# Patient Record
Sex: FEMALE | Race: WHITE | NOT HISPANIC OR LATINO | Employment: FULL TIME | URBAN - METROPOLITAN AREA
[De-identification: names, ages, dates, MRNs, and addresses within clinical notes are randomized per-mention and may not be internally consistent; named-entity substitution may affect disease eponyms.]

---

## 2017-04-10 ENCOUNTER — TRANSCRIBE ORDERS (OUTPATIENT)
Dept: CARDIOLOGY | Facility: CLINIC | Age: 27
End: 2017-04-10

## 2017-04-10 DIAGNOSIS — Z02.89 ENCOUNTER FOR OCCUPATIONAL HEALTH EXAMINATION: Primary | ICD-10-CM

## 2017-04-14 ENCOUNTER — HOSPITAL ENCOUNTER (OUTPATIENT)
Dept: CARDIOLOGY | Facility: HOSPITAL | Age: 27
Discharge: HOME | End: 2017-04-14

## 2017-04-14 DIAGNOSIS — Z02.89 ENCOUNTER FOR OCCUPATIONAL HEALTH EXAMINATION: ICD-10-CM

## 2017-04-14 LAB
BH CV STRESS BP STAGE 1: NORMAL
BH CV STRESS BP STAGE 2: NORMAL
BH CV STRESS BP STAGE 3: NORMAL
BH CV STRESS BP STAGE 4: NORMAL
BH CV STRESS DURATION MIN STAGE 1: 3
BH CV STRESS DURATION MIN STAGE 2: 3
BH CV STRESS DURATION MIN STAGE 3: 3
BH CV STRESS DURATION MIN STAGE 4: 1
BH CV STRESS DURATION SEC STAGE 1: 0
BH CV STRESS DURATION SEC STAGE 2: 0
BH CV STRESS DURATION SEC STAGE 3: 0
BH CV STRESS DURATION SEC STAGE 4: 0
BH CV STRESS GRADE STAGE 1: 10
BH CV STRESS GRADE STAGE 2: 12
BH CV STRESS GRADE STAGE 3: 14
BH CV STRESS GRADE STAGE 4: 16
BH CV STRESS HR STAGE 1: 114
BH CV STRESS HR STAGE 2: 141
BH CV STRESS HR STAGE 3: 186
BH CV STRESS HR STAGE 4: 195
BH CV STRESS METS STAGE 1: 5
BH CV STRESS METS STAGE 2: 7.5
BH CV STRESS METS STAGE 3: 10
BH CV STRESS METS STAGE 4: 13.5
BH CV STRESS PROTOCOL 1: NORMAL
BH CV STRESS RECOVERY BP: NORMAL MMHG
BH CV STRESS RECOVERY HR: 97 BPM
BH CV STRESS SPEED STAGE 1: 1.7
BH CV STRESS SPEED STAGE 2: 2.5
BH CV STRESS SPEED STAGE 3: 3.4
BH CV STRESS SPEED STAGE 4: 4.2
BH CV STRESS STAGE 1: 1
BH CV STRESS STAGE 2: 2
BH CV STRESS STAGE 3: 3
BH CV STRESS STAGE 4: 4
MAXIMAL PREDICTED HEART RATE: 192 BPM
PERCENT MAX PREDICTED HR: 101.56 %
STRESS BASELINE BP: NORMAL MMHG
STRESS BASELINE HR: 65 BPM
STRESS PERCENT HR: 119 %
STRESS POST ESTIMATED WORKLOAD: 11 METS
STRESS POST EXERCISE DUR MIN: 10 MIN
STRESS POST EXERCISE DUR SEC: 0 SEC
STRESS POST PEAK BP: NORMAL MMHG
STRESS POST PEAK HR: 195 BPM
STRESS TARGET HR: 163 BPM

## 2017-04-14 PROCEDURE — 93017 CV STRESS TEST TRACING ONLY: CPT

## 2017-04-14 PROCEDURE — 93018 CV STRESS TEST I&R ONLY: CPT | Performed by: INTERNAL MEDICINE

## 2017-04-14 PROCEDURE — 93016 CV STRESS TEST SUPVJ ONLY: CPT | Performed by: INTERNAL MEDICINE

## 2017-05-15 ENCOUNTER — OFFICE VISIT (OUTPATIENT)
Dept: FAMILY MEDICINE CLINIC | Facility: CLINIC | Age: 27
End: 2017-05-15

## 2017-05-15 VITALS
HEIGHT: 60 IN | DIASTOLIC BLOOD PRESSURE: 74 MMHG | SYSTOLIC BLOOD PRESSURE: 118 MMHG | HEART RATE: 69 BPM | WEIGHT: 141 LBS | TEMPERATURE: 97.9 F | OXYGEN SATURATION: 99 % | BODY MASS INDEX: 27.68 KG/M2

## 2017-05-15 DIAGNOSIS — G43.101 MIGRAINE WITH AURA AND WITH STATUS MIGRAINOSUS, NOT INTRACTABLE: ICD-10-CM

## 2017-05-15 DIAGNOSIS — R82.4 KETONURIA: Primary | ICD-10-CM

## 2017-05-15 DIAGNOSIS — R39.15 URGENCY OF MICTURITION: ICD-10-CM

## 2017-05-15 LAB
BILIRUB BLD-MCNC: NEGATIVE MG/DL
CLARITY, POC: CLEAR
COLOR UR: YELLOW
GLUCOSE UR STRIP-MCNC: NEGATIVE MG/DL
KETONES UR QL: NEGATIVE
LEUKOCYTE EST, POC: ABNORMAL
NITRITE UR-MCNC: NEGATIVE MG/ML
PH UR: 5.5 [PH] (ref 5–8)
PROT UR STRIP-MCNC: NEGATIVE MG/DL
RBC # UR STRIP: NEGATIVE /UL
SP GR UR: 1.03 (ref 1–1.03)
UROBILINOGEN UR QL: NORMAL

## 2017-05-15 PROCEDURE — 99214 OFFICE O/P EST MOD 30 MIN: CPT | Performed by: NURSE PRACTITIONER

## 2017-05-15 PROCEDURE — 81003 URINALYSIS AUTO W/O SCOPE: CPT | Performed by: NURSE PRACTITIONER

## 2017-05-15 RX ORDER — PROPRANOLOL HYDROCHLORIDE 20 MG/1
20 TABLET ORAL 2 TIMES DAILY
Qty: 180 TABLET | Refills: 3 | Status: SHIPPED | OUTPATIENT
Start: 2017-05-15 | End: 2018-06-05 | Stop reason: SDUPTHER

## 2017-05-15 RX ORDER — SUMATRIPTAN 50 MG/1
100 TABLET, FILM COATED ORAL ONCE AS NEEDED
Qty: 27 TABLET | Refills: 3 | Status: SHIPPED | OUTPATIENT
Start: 2017-05-15 | End: 2018-06-05 | Stop reason: SDUPTHER

## 2017-05-17 ENCOUNTER — TELEPHONE (OUTPATIENT)
Dept: FAMILY MEDICINE CLINIC | Facility: CLINIC | Age: 27
End: 2017-05-17

## 2017-05-17 LAB
BACTERIA UR CULT: NORMAL
BACTERIA UR CULT: NORMAL

## 2018-04-11 ENCOUNTER — LAB REQUISITION (OUTPATIENT)
Dept: LAB | Facility: OTHER | Age: 28
End: 2018-04-11

## 2018-04-11 DIAGNOSIS — Z00.00 ROUTINE GENERAL MEDICAL EXAMINATION AT A HEALTH CARE FACILITY: ICD-10-CM

## 2018-04-11 LAB
ALBUMIN SERPL-MCNC: 4.5 G/DL (ref 3.5–5.2)
ALP SERPL-CCNC: 63 U/L (ref 39–117)
ALT SERPL W P-5'-P-CCNC: 12 U/L (ref 1–33)
AST SERPL-CCNC: 17 U/L (ref 1–32)
BACTERIA UR QL AUTO: NORMAL /HPF
BASOPHILS # BLD AUTO: 0.02 10*3/MM3 (ref 0–0.2)
BASOPHILS NFR BLD AUTO: 0.2 % (ref 0–1.5)
BILIRUB CONJ SERPL-MCNC: <0.2 MG/DL (ref 0–0.3)
BILIRUB SERPL-MCNC: 0.4 MG/DL (ref 0.1–1.2)
BILIRUB UR QL STRIP: NEGATIVE
BUN BLD-MCNC: 8 MG/DL (ref 6–20)
CALCIUM SPEC-SCNC: 9.6 MG/DL (ref 8.6–10.5)
CHLORIDE SERPL-SCNC: 102 MMOL/L (ref 98–107)
CHOLEST SERPL-MCNC: 150 MG/DL (ref 0–200)
CLARITY UR: CLEAR
CO2 SERPL-SCNC: 26.1 MMOL/L (ref 22–29)
COLOR UR: YELLOW
CREAT BLD-MCNC: 0.76 MG/DL (ref 0.57–1)
DEPRECATED RDW RBC AUTO: 48.6 FL (ref 37–54)
EOSINOPHIL # BLD AUTO: 0.11 10*3/MM3 (ref 0–0.7)
EOSINOPHIL NFR BLD AUTO: 1.3 % (ref 0.3–6.2)
ERYTHROCYTE [DISTWIDTH] IN BLOOD BY AUTOMATED COUNT: 13.8 % (ref 11.7–13)
GFR SERPL CREATININE-BSD FRML MDRD: 90 ML/MIN/1.73
GGT SERPL-CCNC: 12 U/L (ref 5–36)
GLUCOSE BLD-MCNC: 88 MG/DL (ref 65–99)
GLUCOSE UR STRIP-MCNC: NEGATIVE MG/DL
HCT VFR BLD AUTO: 43.6 % (ref 35.6–45.5)
HDLC SERPL-MCNC: 63 MG/DL (ref 40–60)
HGB BLD-MCNC: 14.1 G/DL (ref 11.9–15.5)
HGB UR QL STRIP.AUTO: ABNORMAL
HYALINE CASTS UR QL AUTO: NORMAL /LPF
IMM GRANULOCYTES # BLD: 0 10*3/MM3 (ref 0–0.03)
IMM GRANULOCYTES NFR BLD: 0 % (ref 0–0.5)
IRON 24H UR-MRATE: 68 MCG/DL (ref 37–145)
KETONES UR QL STRIP: NEGATIVE
LDH SERPL-CCNC: 165 U/L (ref 135–214)
LDLC SERPL CALC-MCNC: 75 MG/DL (ref 0–100)
LDLC/HDLC SERPL: 1.19 {RATIO}
LEUKOCYTE ESTERASE UR QL STRIP.AUTO: NEGATIVE
LYMPHOCYTES # BLD AUTO: 2.37 10*3/MM3 (ref 0.9–4.8)
LYMPHOCYTES NFR BLD AUTO: 28.2 % (ref 19.6–45.3)
MCH RBC QN AUTO: 31.4 PG (ref 26.9–32)
MCHC RBC AUTO-ENTMCNC: 32.3 G/DL (ref 32.4–36.3)
MCV RBC AUTO: 97.1 FL (ref 80.5–98.2)
MONOCYTES # BLD AUTO: 0.41 10*3/MM3 (ref 0.2–1.2)
MONOCYTES NFR BLD AUTO: 4.9 % (ref 5–12)
NEUTROPHILS # BLD AUTO: 5.49 10*3/MM3 (ref 1.9–8.1)
NEUTROPHILS NFR BLD AUTO: 65.4 % (ref 42.7–76)
NITRITE UR QL STRIP: NEGATIVE
PH UR STRIP.AUTO: 7.5 [PH] (ref 5–8)
PHOSPHATE SERPL-MCNC: 2.6 MG/DL (ref 2.5–4.5)
PLATELET # BLD AUTO: 174 10*3/MM3 (ref 140–500)
PMV BLD AUTO: 14.3 FL (ref 6–12)
POTASSIUM BLD-SCNC: 4.6 MMOL/L (ref 3.5–5.2)
PROT SERPL-MCNC: 7.3 G/DL (ref 6–8.5)
PROT UR QL STRIP: NEGATIVE
RBC # BLD AUTO: 4.49 10*6/MM3 (ref 3.9–5.2)
RBC # UR: NORMAL /HPF
REF LAB TEST METHOD: NORMAL
SODIUM BLD-SCNC: 141 MMOL/L (ref 136–145)
SP GR UR STRIP: <1.005 (ref 1–1.03)
SQUAMOUS #/AREA URNS HPF: NORMAL /HPF
TRIGL SERPL-MCNC: 60 MG/DL (ref 0–150)
URATE SERPL-MCNC: 3.7 MG/DL (ref 2.4–5.7)
UROBILINOGEN UR QL STRIP: ABNORMAL
VLDLC SERPL-MCNC: 12 MG/DL (ref 5–40)
WBC NRBC COR # BLD: 8.4 10*3/MM3 (ref 4.5–10.7)
WBC UR QL AUTO: NORMAL /HPF

## 2018-04-11 PROCEDURE — 81001 URINALYSIS AUTO W/SCOPE: CPT | Performed by: PHYSICIAN ASSISTANT

## 2018-04-11 PROCEDURE — 86481 TB AG RESPONSE T-CELL SUSP: CPT | Performed by: PHYSICIAN ASSISTANT

## 2018-04-11 PROCEDURE — 80061 LIPID PANEL: CPT | Performed by: PHYSICIAN ASSISTANT

## 2018-04-11 PROCEDURE — 85025 COMPLETE CBC W/AUTO DIFF WBC: CPT | Performed by: PHYSICIAN ASSISTANT

## 2018-04-11 PROCEDURE — 80053 COMPREHEN METABOLIC PANEL: CPT | Performed by: PHYSICIAN ASSISTANT

## 2018-04-13 LAB
TSPOT INTERPRETATION: NEGATIVE
TSPOT NIL CONTROL INTERPRETATION: NORMAL
TSPOT PANEL A: 0
TSPOT PANEL B: 0
TSPOT POS CONTROL INTERPRETATION: NORMAL

## 2018-06-05 ENCOUNTER — OFFICE VISIT (OUTPATIENT)
Dept: FAMILY MEDICINE CLINIC | Facility: CLINIC | Age: 28
End: 2018-06-05

## 2018-06-05 VITALS
OXYGEN SATURATION: 98 % | HEART RATE: 67 BPM | BODY MASS INDEX: 27.09 KG/M2 | DIASTOLIC BLOOD PRESSURE: 64 MMHG | HEIGHT: 60 IN | WEIGHT: 138 LBS | TEMPERATURE: 98.4 F | SYSTOLIC BLOOD PRESSURE: 116 MMHG

## 2018-06-05 DIAGNOSIS — R30.0 DYSURIA: ICD-10-CM

## 2018-06-05 DIAGNOSIS — G43.101 MIGRAINE WITH AURA AND WITH STATUS MIGRAINOSUS, NOT INTRACTABLE: ICD-10-CM

## 2018-06-05 DIAGNOSIS — Z01.419 ENCOUNTER FOR GYNECOLOGICAL EXAMINATION WITH PAPANICOLAOU SMEAR OF CERVIX: Primary | ICD-10-CM

## 2018-06-05 DIAGNOSIS — Z30.019 ENCOUNTER FOR INITIAL PRESCRIPTION OF CONTRACEPTIVES, UNSPECIFIED CONTRACEPTIVE: ICD-10-CM

## 2018-06-05 LAB
B-HCG UR QL: NEGATIVE
BILIRUB BLD-MCNC: NEGATIVE MG/DL
CLARITY, POC: CLEAR
COLOR UR: YELLOW
GLUCOSE UR STRIP-MCNC: NEGATIVE MG/DL
INTERNAL NEGATIVE CONTROL: NEGATIVE
INTERNAL POSITIVE CONTROL: POSITIVE
KETONES UR QL: ABNORMAL
LEUKOCYTE EST, POC: NEGATIVE
Lab: NORMAL
NITRITE UR-MCNC: NEGATIVE MG/ML
PH UR: 8 [PH] (ref 5–8)
PROT UR STRIP-MCNC: NEGATIVE MG/DL
RBC # UR STRIP: NEGATIVE /UL
SP GR UR: 1.01 (ref 1–1.03)
UROBILINOGEN UR QL: NORMAL

## 2018-06-05 PROCEDURE — 81025 URINE PREGNANCY TEST: CPT | Performed by: NURSE PRACTITIONER

## 2018-06-05 PROCEDURE — 99213 OFFICE O/P EST LOW 20 MIN: CPT | Performed by: NURSE PRACTITIONER

## 2018-06-05 PROCEDURE — 81003 URINALYSIS AUTO W/O SCOPE: CPT | Performed by: NURSE PRACTITIONER

## 2018-06-05 PROCEDURE — 99395 PREV VISIT EST AGE 18-39: CPT | Performed by: NURSE PRACTITIONER

## 2018-06-05 RX ORDER — SUMATRIPTAN 50 MG/1
100 TABLET, FILM COATED ORAL ONCE AS NEEDED
Qty: 27 TABLET | Refills: 3 | Status: SHIPPED | OUTPATIENT
Start: 2018-06-05 | End: 2019-03-18 | Stop reason: SDUPTHER

## 2018-06-05 RX ORDER — PROPRANOLOL HYDROCHLORIDE 20 MG/1
TABLET ORAL
Qty: 180 TABLET | Refills: 3 | Status: SHIPPED | OUTPATIENT
Start: 2018-06-05 | End: 2021-09-14

## 2018-06-05 RX ORDER — DROSPIRENONE AND ETHINYL ESTRADIOL 0.03MG-3MG
1 KIT ORAL DAILY
Qty: 28 TABLET | Refills: 12 | Status: SHIPPED | OUTPATIENT
Start: 2018-06-05 | End: 2018-09-25

## 2018-06-05 NOTE — PROGRESS NOTES
Subjective   Betsy Simon is a 28 y.o. female. PAP SMEAR, STD screening.  She also would like to get on birth control.     History of Present Illness   Routine Gyn Exam: Patient here for routine exam.  Current Complaints: annual pap.  Personal Health Questionnaire Reviewed: not asked     Gynecologic History  No LMP recorded.  Contraception: condoms  Last Pap:   Results: normal  Last Mammogram: NA  Results: NA    Obstetric History  : 0  Para: 0  AB: 0    The following portions of the patient's history were reviewed and updated as appropriate: allergies, current medications, past family history, past medical history, past social history, past surgical history and problem list.    Review of Systems   Constitutional: Negative.    Respiratory: Negative.    Cardiovascular: Negative.    Endocrine: Negative for breast discharge.   Genitourinary: Positive for dysuria. Negative for menstrual problem, pelvic pain and breast lump.       Objective   Physical Exam   Constitutional: Vital signs are normal. She appears well-developed and well-nourished. She is cooperative.   Cardiovascular: Normal rate, regular rhythm and normal heart sounds.    Pulmonary/Chest: Effort normal and breath sounds normal. Right breast exhibits no inverted nipple, no mass, no nipple discharge, no skin change and no tenderness. Left breast exhibits no inverted nipple, no mass, no nipple discharge, no skin change and no tenderness. Breasts are symmetrical.   Abdominal: Soft. Normal appearance and bowel sounds are normal.   Genitourinary: Vagina normal, uterus normal and cervix normal. Pelvic exam was performed with patient supine.   Cervix is nulliparous. Right adnexum displays no mass and no tenderness. Left adnexum displays no mass and no tenderness.   Neurological: She is alert.   Nursing note and vitals reviewed.    Vitals:    18 1605   BP: 116/64   Pulse: 67   Temp: 98.4 °F (36.9 °C)   TempSrc: Oral   SpO2: 98%   Weight: 62.6 kg  "(138 lb)   Height: 152.4 cm (60\")     Results for orders placed or performed in visit on 06/05/18   POC Urinalysis Dipstick, Automated   Result Value Ref Range    Color Yellow Yellow, Straw, Dark Yellow, Nay    Clarity, UA Clear Clear    Specific Gravity  1.015 1.005 - 1.030    pH, Urine 8.0 5.0 - 8.0    Leukocytes Negative Negative    Nitrite, UA Negative Negative    Protein, POC Negative Negative mg/dL    Glucose, UA Negative Negative, 1000 mg/dL (3+) mg/dL    Ketones, UA 15 mg/dL (A) Negative    Urobilinogen, UA Normal Normal    Bilirubin Negative Negative    Blood, UA Negative Negative   POC Pregnancy, Urine   Result Value Ref Range    HCG, Urine, QL Negative Negative    Lot Number [id number]     Internal Positive Control Positive     Internal Negative Control Negative        Assessment/Plan   Diagnoses and all orders for this visit:    Encounter for gynecological examination with Papanicolaou smear of cervix  -     Pap IG, Ct-Ng TV Rfx HPV ASCU  -     NuSwab VG+ & HSV    Dysuria  -     NuSwab VG+ & HSV  -     POC Urinalysis Dipstick, Automated    Encounter for initial prescription of contraceptives, unspecified contraceptive  -     POC Pregnancy, Urine  -     drospirenone-ethinyl estradiol (YAMILA 28) 3-0.03 MG per tablet; Take 1 tablet by mouth Daily.    Migraine with aura and with status migrainosus, not intractable  -     propranolol (INDERAL) 20 MG tablet; Take one tablet two times daily  -     SUMAtriptan (IMITREX) 50 MG tablet; Take 2 tablets by mouth 1 (One) Time As Needed for Migraine (may repeat in 2 hrs.) for up to 1 dose.    Pap: Will call results  Pregnancy test negative. Discussed OCP, side effects, cautions.                "

## 2018-06-08 LAB
C TRACH RRNA CVX QL NAA+PROBE: NEGATIVE
CONV .: NORMAL
CYTOLOGIST CVX/VAG CYTO: NORMAL
CYTOLOGY CVX/VAG DOC THIN PREP: NORMAL
DX ICD CODE: NORMAL
HIV 1 & 2 AB SER-IMP: NORMAL
Lab: NORMAL
N GONORRHOEA RRNA CVX QL NAA+PROBE: NEGATIVE
OTHER STN SPEC: NORMAL
PATH REPORT.FINAL DX SPEC: NORMAL
STAT OF ADQ CVX/VAG CYTO-IMP: NORMAL
T VAGINALIS RRNA SPEC QL NAA+PROBE: NEGATIVE

## 2018-06-09 LAB
A VAGINAE DNA VAG QL NAA+PROBE: NORMAL SCORE
BVAB2 DNA VAG QL NAA+PROBE: NORMAL SCORE
C ALBICANS DNA VAG QL NAA+PROBE: NEGATIVE
C GLABRATA DNA VAG QL NAA+PROBE: NEGATIVE
C TRACH RRNA SPEC QL NAA+PROBE: NEGATIVE
HSV1 DNA SPEC QL NAA+PROBE: NEGATIVE
HSV2 DNA SPEC QL NAA+PROBE: NEGATIVE
Lab: NORMAL
MEGA1 DNA VAG QL NAA+PROBE: NORMAL SCORE
N GONORRHOEA RRNA SPEC QL NAA+PROBE: NEGATIVE
T VAGINALIS RRNA SPEC QL NAA+PROBE: NEGATIVE

## 2018-08-10 ENCOUNTER — OFFICE VISIT (OUTPATIENT)
Dept: FAMILY MEDICINE CLINIC | Facility: CLINIC | Age: 28
End: 2018-08-10

## 2018-08-10 VITALS
WEIGHT: 134 LBS | SYSTOLIC BLOOD PRESSURE: 118 MMHG | OXYGEN SATURATION: 98 % | HEIGHT: 60 IN | HEART RATE: 66 BPM | DIASTOLIC BLOOD PRESSURE: 78 MMHG | BODY MASS INDEX: 26.31 KG/M2 | TEMPERATURE: 98.5 F

## 2018-08-10 DIAGNOSIS — R30.0 DYSURIA: Primary | ICD-10-CM

## 2018-08-10 DIAGNOSIS — N30.01 ACUTE CYSTITIS WITH HEMATURIA: ICD-10-CM

## 2018-08-10 LAB
BILIRUB BLD-MCNC: NEGATIVE MG/DL
CLARITY, POC: ABNORMAL
COLOR UR: ABNORMAL
GLUCOSE UR STRIP-MCNC: NEGATIVE MG/DL
KETONES UR QL: ABNORMAL
LEUKOCYTE EST, POC: ABNORMAL
NITRITE UR-MCNC: NEGATIVE MG/ML
PH UR: 7 [PH] (ref 5–8)
PROT UR STRIP-MCNC: NEGATIVE MG/DL
RBC # UR STRIP: ABNORMAL /UL
SP GR UR: 1.02 (ref 1–1.03)
UROBILINOGEN UR QL: NORMAL

## 2018-08-10 PROCEDURE — 99213 OFFICE O/P EST LOW 20 MIN: CPT | Performed by: NURSE PRACTITIONER

## 2018-08-10 PROCEDURE — 81003 URINALYSIS AUTO W/O SCOPE: CPT | Performed by: NURSE PRACTITIONER

## 2018-08-10 RX ORDER — SULFAMETHOXAZOLE AND TRIMETHOPRIM 800; 160 MG/1; MG/1
1 TABLET ORAL 2 TIMES DAILY
Qty: 14 TABLET | Refills: 0 | Status: SHIPPED | OUTPATIENT
Start: 2018-08-10 | End: 2019-03-18

## 2018-08-10 RX ORDER — PHENAZOPYRIDINE HYDROCHLORIDE 100 MG/1
100 TABLET, FILM COATED ORAL 3 TIMES DAILY PRN
Qty: 12 TABLET | Refills: 0 | Status: SHIPPED | OUTPATIENT
Start: 2018-08-10 | End: 2019-03-18

## 2018-08-10 NOTE — PROGRESS NOTES
"Subjective   Betsy Simon is a 28 y.o. female who presents c/o pain with urination x 1 week. Now with low back pain and nausea.     History of Present Illness   Urinary symptoms x 1 week, though was treated by teledoc for vaginitis. Thinks resolved. Vaginal burning has resolved. Last period was 7/23/18, far low back pain and nausea.   The following portions of the patient's history were reviewed and updated as appropriate: allergies, current medications, past family history, past medical history, past social history, past surgical history and problem list.    Review of Systems   Constitutional: Negative for chills and fever.   Genitourinary: Positive for difficulty urinating and dysuria. Negative for urinary incontinence, frequency, urgency and vaginal discharge.   Musculoskeletal: Positive for back pain.   Psychiatric/Behavioral: Negative.      /78   Pulse 66   Temp 98.5 °F (36.9 °C) (Oral)   Ht 152.4 cm (60\")   Wt 60.8 kg (134 lb)   SpO2 98%   BMI 26.17 kg/m²     Objective   Physical Exam   Constitutional: She appears well-developed and well-nourished. No distress.   Abdominal: Soft. There is no tenderness. There is no CVA tenderness.   Psychiatric: She has a normal mood and affect. Her speech is normal and behavior is normal.   Nursing note and vitals reviewed.    Assessment/Plan   Problems Addressed this Visit     None      Visit Diagnoses     Dysuria    -  Primary    Relevant Orders    POCT urinalysis dipstick, automated (Completed)    Urine Culture - Urine, Urine, Clean Catch    Acute cystitis with hematuria        Relevant Medications    phenazopyridine (PYRIDIUM) 100 MG tablet        Results for orders placed or performed in visit on 08/10/18   POCT urinalysis dipstick, automated   Result Value Ref Range    Color Dark Yellow Yellow, Straw, Dark Yellow, Nay    Clarity, UA Cloudy (A) Clear    Specific Gravity  1.020 1.005 - 1.030    pH, Urine 7.0 5.0 - 8.0    Leukocytes Small (1+) (A) " Negative    Nitrite, UA Negative Negative    Protein, POC Negative Negative mg/dL    Glucose, UA Negative Negative, 1000 mg/dL (3+) mg/dL    Ketones, UA Trace (A) Negative    Urobilinogen, UA Normal Normal    Bilirubin Negative Negative    Blood, UA Large (A) Negative     FU 3 days if not settling. Will send for culture. Start bactrim DS for infection

## 2018-08-14 LAB
BACTERIA UR CULT: NORMAL
BACTERIA UR CULT: NORMAL

## 2018-09-25 ENCOUNTER — TELEPHONE (OUTPATIENT)
Dept: FAMILY MEDICINE CLINIC | Facility: CLINIC | Age: 28
End: 2018-09-25

## 2018-09-25 RX ORDER — NORETHINDRONE ACETATE AND ETHINYL ESTRADIOL 1; .02 MG/1; MG/1
1 TABLET ORAL DAILY
Qty: 21 TABLET | Refills: 12 | Status: SHIPPED | OUTPATIENT
Start: 2018-09-25 | End: 2019-03-05 | Stop reason: SDUPTHER

## 2018-09-26 ENCOUNTER — TELEPHONE (OUTPATIENT)
Dept: FAMILY MEDICINE CLINIC | Facility: CLINIC | Age: 28
End: 2018-09-26

## 2019-01-30 ENCOUNTER — LAB REQUISITION (OUTPATIENT)
Dept: LAB | Facility: OTHER | Age: 29
End: 2019-01-30

## 2019-01-30 DIAGNOSIS — Z00.00 ROUTINE GENERAL MEDICAL EXAMINATION AT A HEALTH CARE FACILITY: ICD-10-CM

## 2019-01-30 LAB
ALBUMIN SERPL-MCNC: 4.2 G/DL (ref 3.5–5.2)
ALP SERPL-CCNC: 48 U/L (ref 39–117)
ALT SERPL W P-5'-P-CCNC: 29 U/L (ref 1–33)
AST SERPL-CCNC: 30 U/L (ref 1–32)
BACTERIA UR QL AUTO: ABNORMAL /HPF
BASOPHILS # BLD AUTO: 0.01 10*3/MM3 (ref 0–0.2)
BASOPHILS NFR BLD AUTO: 0.1 % (ref 0–1.5)
BILIRUB CONJ SERPL-MCNC: <0.2 MG/DL (ref 0–0.3)
BILIRUB SERPL-MCNC: 0.3 MG/DL (ref 0.1–1.2)
BILIRUB UR QL STRIP: NEGATIVE
BUN BLD-MCNC: 10 MG/DL (ref 6–20)
CALCIUM SPEC-SCNC: 10 MG/DL (ref 8.6–10.5)
CHLORIDE SERPL-SCNC: 103 MMOL/L (ref 98–107)
CHOLEST SERPL-MCNC: 193 MG/DL (ref 0–200)
CLARITY UR: CLEAR
CO2 SERPL-SCNC: 25.4 MMOL/L (ref 22–29)
COLOR UR: YELLOW
CREAT BLD-MCNC: 0.86 MG/DL (ref 0.57–1)
DEPRECATED RDW RBC AUTO: 48 FL (ref 37–54)
EOSINOPHIL # BLD AUTO: 0.11 10*3/MM3 (ref 0–0.7)
EOSINOPHIL NFR BLD AUTO: 1.1 % (ref 0.3–6.2)
ERYTHROCYTE [DISTWIDTH] IN BLOOD BY AUTOMATED COUNT: 13.5 % (ref 11.7–13)
GFR SERPL CREATININE-BSD FRML MDRD: 77 ML/MIN/1.73
GGT SERPL-CCNC: 11 U/L (ref 5–36)
GLUCOSE BLD-MCNC: 87 MG/DL (ref 65–99)
GLUCOSE UR STRIP-MCNC: NEGATIVE MG/DL
HBV SURFACE AB SER RIA-ACNC: REACTIVE
HCT VFR BLD AUTO: 43.6 % (ref 35.6–45.5)
HDLC SERPL-MCNC: 68 MG/DL (ref 40–60)
HGB BLD-MCNC: 14.1 G/DL (ref 11.9–15.5)
HGB UR QL STRIP.AUTO: ABNORMAL
HYALINE CASTS UR QL AUTO: ABNORMAL /LPF
IMM GRANULOCYTES # BLD AUTO: 0.02 10*3/MM3 (ref 0–0.03)
IMM GRANULOCYTES NFR BLD AUTO: 0.2 % (ref 0–0.5)
IRON 24H UR-MRATE: 144 MCG/DL (ref 37–145)
KETONES UR QL STRIP: NEGATIVE
LDH SERPL-CCNC: 210 U/L (ref 135–214)
LDLC SERPL CALC-MCNC: 104 MG/DL (ref 0–100)
LDLC/HDLC SERPL: 1.54 {RATIO}
LEUKOCYTE ESTERASE UR QL STRIP.AUTO: ABNORMAL
LYMPHOCYTES # BLD AUTO: 2.69 10*3/MM3 (ref 0.9–4.8)
LYMPHOCYTES NFR BLD AUTO: 26.6 % (ref 19.6–45.3)
MCH RBC QN AUTO: 31.6 PG (ref 26.9–32)
MCHC RBC AUTO-ENTMCNC: 32.3 G/DL (ref 32.4–36.3)
MCV RBC AUTO: 97.8 FL (ref 80.5–98.2)
MONOCYTES # BLD AUTO: 0.64 10*3/MM3 (ref 0.2–1.2)
MONOCYTES NFR BLD AUTO: 6.3 % (ref 5–12)
NEUTROPHILS # BLD AUTO: 6.64 10*3/MM3 (ref 1.9–8.1)
NEUTROPHILS NFR BLD AUTO: 65.7 % (ref 42.7–76)
NITRITE UR QL STRIP: NEGATIVE
PH UR STRIP.AUTO: 7 [PH] (ref 5–8)
PHOSPHATE SERPL-MCNC: 3 MG/DL (ref 2.5–4.5)
PLATELET # BLD AUTO: 199 10*3/MM3 (ref 140–500)
PMV BLD AUTO: 13.2 FL (ref 6–12)
POTASSIUM BLD-SCNC: 5.5 MMOL/L (ref 3.5–5.2)
PROT SERPL-MCNC: 7.2 G/DL (ref 6–8.5)
PROT UR QL STRIP: NEGATIVE
RBC # BLD AUTO: 4.46 10*6/MM3 (ref 3.9–5.2)
RBC # UR: ABNORMAL /HPF
REF LAB TEST METHOD: ABNORMAL
SODIUM BLD-SCNC: 142 MMOL/L (ref 136–145)
SP GR UR STRIP: 1.02 (ref 1–1.03)
SQUAMOUS #/AREA URNS HPF: ABNORMAL /HPF
TRIGL SERPL-MCNC: 103 MG/DL (ref 0–150)
URATE SERPL-MCNC: 3.2 MG/DL (ref 2.4–5.7)
UROBILINOGEN UR QL STRIP: ABNORMAL
VLDLC SERPL-MCNC: 20.6 MG/DL (ref 5–40)
WBC NRBC COR # BLD: 10.11 10*3/MM3 (ref 4.5–10.7)
WBC UR QL AUTO: ABNORMAL /HPF

## 2019-01-30 PROCEDURE — 85025 COMPLETE CBC W/AUTO DIFF WBC: CPT | Performed by: PHYSICAL MEDICINE & REHABILITATION

## 2019-01-30 PROCEDURE — 80053 COMPREHEN METABOLIC PANEL: CPT | Performed by: PHYSICAL MEDICINE & REHABILITATION

## 2019-01-30 PROCEDURE — 80061 LIPID PANEL: CPT | Performed by: PHYSICAL MEDICINE & REHABILITATION

## 2019-01-30 PROCEDURE — 86706 HEP B SURFACE ANTIBODY: CPT | Performed by: PHYSICAL MEDICINE & REHABILITATION

## 2019-01-30 PROCEDURE — 81001 URINALYSIS AUTO W/SCOPE: CPT | Performed by: PHYSICAL MEDICINE & REHABILITATION

## 2019-03-06 RX ORDER — NORETHINDRONE ACETATE AND ETHINYL ESTRADIOL 1; .02 MG/1; MG/1
1 TABLET ORAL DAILY
Qty: 21 TABLET | Refills: 4 | Status: SHIPPED | OUTPATIENT
Start: 2019-03-06 | End: 2019-08-13 | Stop reason: ALTCHOICE

## 2019-03-18 ENCOUNTER — OFFICE VISIT (OUTPATIENT)
Dept: FAMILY MEDICINE CLINIC | Facility: CLINIC | Age: 29
End: 2019-03-18

## 2019-03-18 VITALS
OXYGEN SATURATION: 99 % | TEMPERATURE: 97.8 F | HEART RATE: 75 BPM | WEIGHT: 142 LBS | DIASTOLIC BLOOD PRESSURE: 62 MMHG | SYSTOLIC BLOOD PRESSURE: 122 MMHG | BODY MASS INDEX: 27.73 KG/M2

## 2019-03-18 DIAGNOSIS — G43.101 MIGRAINE WITH AURA AND WITH STATUS MIGRAINOSUS, NOT INTRACTABLE: Primary | ICD-10-CM

## 2019-03-18 DIAGNOSIS — R53.83 OTHER FATIGUE: ICD-10-CM

## 2019-03-18 PROCEDURE — 99214 OFFICE O/P EST MOD 30 MIN: CPT | Performed by: FAMILY MEDICINE

## 2019-03-18 RX ORDER — SUMATRIPTAN 100 MG/1
100 TABLET, FILM COATED ORAL ONCE AS NEEDED
Qty: 27 TABLET | Refills: 3 | Status: SHIPPED | OUTPATIENT
Start: 2019-03-18 | End: 2019-09-03 | Stop reason: SDUPTHER

## 2019-03-18 NOTE — PROGRESS NOTES
Subjective   Betsy Simon is a 29 y.o. female. Presents today for   Chief Complaint   Patient presents with   • Headache     follow up on med and ask about b12 injections.         Fatigue   This is a chronic problem. The current episode started more than 1 year ago. The problem occurs constantly. The problem has been unchanged. Associated symptoms include fatigue and nausea. Pertinent negatives include no abdominal pain, chest pain or vomiting. Associated symptoms comments: NO heavy periods;  Feels tired all time.  Feels exhausted when wakes up in am.  [place] snores.  Has not had sleep study.      Denies depression. Nothing aggravates the symptoms. Treatments tried: mvi and b vitamins. The treatment provided no relief.   Migraine    This is a chronic problem. The current episode started more than 1 year ago. The problem occurs intermittently. The problem has been waxing and waning. The pain is located in the temporal and left unilateral region. The quality of the pain is described as throbbing. Associated symptoms include nausea, phonophobia and photophobia. Pertinent negatives include no abdominal pain, blurred vision or vomiting. Associated symptoms comments: Used to have auras, but none for a while.. The symptoms are aggravated by caffeine withdrawal and food (smells, noises can trigger). She has tried triptans (on inderal, helped at 1st though misses doses) for the symptoms. The treatment provided mild (intolerant topamax in past;  1 other forgets name of.) relief. Her past medical history is significant for migraine headaches.     On recent testing for work K+ 5.5;      Review of Systems   Constitutional: Positive for fatigue.   Eyes: Positive for photophobia. Negative for blurred vision.   Respiratory: Negative for shortness of breath.    Cardiovascular: Negative for chest pain, palpitations and leg swelling.   Gastrointestinal: Positive for nausea. Negative for abdominal pain and vomiting.    Psychiatric/Behavioral: Negative for sleep disturbance. The patient is not nervous/anxious.        Patient Active Problem List   Diagnosis   • Headache, migraine   • Allergic rhinitis, seasonal   • Weight gain, abnormal   • Ketonuria   • Orthostasis       Social History     Socioeconomic History   • Marital status: Single     Spouse name: Not on file   • Number of children: Not on file   • Years of education: Not on file   • Highest education level: Not on file   Tobacco Use   • Smoking status: Never Smoker   Substance and Sexual Activity   • Alcohol use: No       No Known Allergies    Current Outpatient Medications on File Prior to Visit   Medication Sig Dispense Refill   • norethindrone-ethinyl estradiol (MICROGESTIN 1/20) 1-20 MG-MCG per tablet TAKE 1 TABLET BY MOUTH DAILY 21 tablet 4   • propranolol (INDERAL) 20 MG tablet Take one tablet two times daily 180 tablet 3   • [DISCONTINUED] SUMAtriptan (IMITREX) 50 MG tablet Take 2 tablets by mouth 1 (One) Time As Needed for Migraine (may repeat in 2 hrs.) for up to 1 dose. 27 tablet 3   • [DISCONTINUED] phenazopyridine (PYRIDIUM) 100 MG tablet Take 1 tablet by mouth 3 (Three) Times a Day As Needed for bladder spasms. 12 tablet 0   • [DISCONTINUED] sulfamethoxazole-trimethoprim (BACTRIM DS,SEPTRA DS) 800-160 MG per tablet Take 1 tablet by mouth 2 (Two) Times a Day. 14 tablet 0     Current Facility-Administered Medications on File Prior to Visit   Medication Dose Route Frequency Provider Last Rate Last Dose   • patient supplied allergy injection  0.15 mL Subcutaneous Once Natalie Rankin APRN       • patient supplied allergy injection  0.15 mL Subcutaneous Weekly Natalie Rankin APRN   0.15 mL at 09/15/16 1538   • patient supplied allergy injection  0.15 mL Subcutaneous Weekly Natalie Rankin APRN   0.4 mL at 09/15/16 1536       Objective   Vitals:    03/18/19 1327   BP: 122/62   Pulse: 75   Temp: 97.8 °F (36.6 °C)   SpO2: 99%   Weight: 64.4 kg (142 lb)        Physical Exam   Constitutional: She appears well-developed and well-nourished.   HENT:   Head: Normocephalic and atraumatic.   Nose: Nose normal.   Mouth/Throat: Uvula is midline, oropharynx is clear and moist and mucous membranes are normal.   Neck: Neck supple. No JVD present. No thyroid mass and no thyromegaly present.   Cardiovascular: Normal rate, regular rhythm and normal heart sounds. Exam reveals no gallop and no friction rub.   No murmur heard.  Pulmonary/Chest: Effort normal and breath sounds normal. No respiratory distress. She has no wheezes. She has no rales.   Abdominal: Soft. Bowel sounds are normal. She exhibits no distension. There is no tenderness. There is no rebound and no guarding.   Musculoskeletal: She exhibits no edema.   Neurological: She is alert.   Skin: Skin is warm and dry.   Psychiatric: She has a normal mood and affect. Her behavior is normal.   Nursing note and vitals reviewed.      Assessment/Plan   Betsy was seen today for headache.    Diagnoses and all orders for this visit:    Migraine with aura and with status migrainosus, not intractable  -     Erenumab-aooe (AIMOVIG) 70 MG/ML prefilled syringe; Inject 1 mL under the skin into the appropriate area as directed Every 30 (Thirty) Days.  -     SUMAtriptan (IMITREX) 100 MG tablet; Take 1 tablet by mouth 1 (One) Time As Needed for Migraine (may repeat in 2 hrs.) for up to 1 dose.    Other fatigue  -     Comprehensive Metabolic Panel  -     Vitamin B12  -     TSH  -     Ambulatory Referral to Sleep Medicine    -d/w migraine options and will try adding aimovig, work on taking inderal scheduled, did warn can cause fatigue  -fatigue - check renal function, b12 and TSH;  Patient had normal CBC recently.  Reports unrested in am, refer for sleep study as well  -K+ was up on recent occupational labs, will recheck as well       -Follow up:  6 weeks and prn

## 2019-03-19 LAB
ALBUMIN SERPL-MCNC: 4.1 G/DL (ref 3.5–5.5)
ALBUMIN/GLOB SERPL: 1.5 {RATIO} (ref 1.2–2.2)
ALP SERPL-CCNC: 60 IU/L (ref 39–117)
ALT SERPL-CCNC: 15 IU/L (ref 0–32)
AST SERPL-CCNC: 14 IU/L (ref 0–40)
BILIRUB SERPL-MCNC: <0.2 MG/DL (ref 0–1.2)
BUN SERPL-MCNC: 12 MG/DL (ref 6–20)
BUN/CREAT SERPL: 14 (ref 9–23)
CALCIUM SERPL-MCNC: 9.3 MG/DL (ref 8.7–10.2)
CHLORIDE SERPL-SCNC: 103 MMOL/L (ref 96–106)
CO2 SERPL-SCNC: 25 MMOL/L (ref 20–29)
CREAT SERPL-MCNC: 0.88 MG/DL (ref 0.57–1)
GLOBULIN SER CALC-MCNC: 2.7 G/DL (ref 1.5–4.5)
GLUCOSE SERPL-MCNC: 111 MG/DL (ref 65–99)
POTASSIUM SERPL-SCNC: 4.3 MMOL/L (ref 3.5–5.2)
PROT SERPL-MCNC: 6.8 G/DL (ref 6–8.5)
SODIUM SERPL-SCNC: 141 MMOL/L (ref 134–144)
TSH SERPL DL<=0.005 MIU/L-ACNC: 0.74 UIU/ML (ref 0.45–4.5)
VIT B12 SERPL-MCNC: 687 PG/ML (ref 232–1245)

## 2019-03-26 ENCOUNTER — TELEPHONE (OUTPATIENT)
Dept: FAMILY MEDICINE CLINIC | Facility: CLINIC | Age: 29
End: 2019-03-26

## 2019-03-27 ENCOUNTER — TELEPHONE (OUTPATIENT)
Dept: FAMILY MEDICINE CLINIC | Facility: CLINIC | Age: 29
End: 2019-03-27

## 2019-03-28 DIAGNOSIS — R53.83 OTHER FATIGUE: Primary | ICD-10-CM

## 2019-04-16 ENCOUNTER — LAB REQUISITION (OUTPATIENT)
Dept: LAB | Facility: OTHER | Age: 29
End: 2019-04-16

## 2019-04-16 DIAGNOSIS — Z00.00 ROUTINE GENERAL MEDICAL EXAMINATION AT A HEALTH CARE FACILITY: ICD-10-CM

## 2019-04-16 PROCEDURE — 86481 TB AG RESPONSE T-CELL SUSP: CPT | Performed by: PHYSICAL MEDICINE & REHABILITATION

## 2019-04-18 LAB
TSPOT INTERPRETATION: NEGATIVE
TSPOT NIL CONTROL INTERPRETATION: NORMAL
TSPOT PANEL A: 1
TSPOT PANEL B: 1
TSPOT POS CONTROL INTERPRETATION: NORMAL

## 2019-05-29 ENCOUNTER — APPOINTMENT (OUTPATIENT)
Dept: SLEEP MEDICINE | Facility: HOSPITAL | Age: 29
End: 2019-05-29

## 2019-08-13 RX ORDER — DROSPIRENONE AND ETHINYL ESTRADIOL 0.03MG-3MG
1 KIT ORAL DAILY
Qty: 21 TABLET | Refills: 11 | Status: SHIPPED | OUTPATIENT
Start: 2019-08-13 | End: 2020-05-11

## 2019-09-03 DIAGNOSIS — G43.101 MIGRAINE WITH AURA AND WITH STATUS MIGRAINOSUS, NOT INTRACTABLE: ICD-10-CM

## 2019-09-03 RX ORDER — SUMATRIPTAN 100 MG/1
100 TABLET, FILM COATED ORAL ONCE AS NEEDED
Qty: 27 TABLET | Refills: 3 | Status: SHIPPED | OUTPATIENT
Start: 2019-09-03 | End: 2023-01-12

## 2020-05-11 RX ORDER — DROSPIRENONE AND ETHINYL ESTRADIOL 0.03MG-3MG
1 KIT ORAL DAILY
Qty: 90 TABLET | Refills: 0 | Status: SHIPPED | OUTPATIENT
Start: 2020-05-11 | End: 2021-09-14

## 2020-08-03 RX ORDER — DROSPIRENONE AND ETHINYL ESTRADIOL 0.03MG-3MG
KIT ORAL
Qty: 84 TABLET | Refills: 3 | OUTPATIENT
Start: 2020-08-03

## 2020-09-28 RX ORDER — DROSPIRENONE AND ETHINYL ESTRADIOL 0.03MG-3MG
KIT ORAL
Qty: 28 TABLET | OUTPATIENT
Start: 2020-09-28

## 2020-10-13 ENCOUNTER — TRANSCRIBE ORDERS (OUTPATIENT)
Dept: ADMINISTRATIVE | Facility: HOSPITAL | Age: 30
End: 2020-10-13

## 2020-10-13 DIAGNOSIS — N93.9 ABNORMAL UTERINE BLEEDING (AUB): Primary | ICD-10-CM

## 2020-10-16 ENCOUNTER — APPOINTMENT (OUTPATIENT)
Dept: GENERAL RADIOLOGY | Facility: HOSPITAL | Age: 30
End: 2020-10-16

## 2021-09-13 PROBLEM — R87.613 HIGH GRADE SQUAMOUS INTRAEPITHELIAL CERVICAL DYSPLASIA: Status: ACTIVE | Noted: 2020-01-01

## 2021-09-13 PROBLEM — C53.9 SQUAMOUS CELL CARCINOMA OF CERVIX (HCC): Status: ACTIVE | Noted: 2021-09-13

## 2021-09-13 PROBLEM — D06.9: Status: ACTIVE | Noted: 2021-09-13

## 2021-09-14 ENCOUNTER — APPOINTMENT (OUTPATIENT)
Dept: LAB | Facility: HOSPITAL | Age: 31
End: 2021-09-14

## 2021-09-14 ENCOUNTER — OFFICE VISIT (OUTPATIENT)
Dept: GYNECOLOGIC ONCOLOGY | Facility: CLINIC | Age: 31
End: 2021-09-14

## 2021-09-14 VITALS
HEIGHT: 62 IN | DIASTOLIC BLOOD PRESSURE: 83 MMHG | BODY MASS INDEX: 32.18 KG/M2 | RESPIRATION RATE: 18 BRPM | OXYGEN SATURATION: 97 % | SYSTOLIC BLOOD PRESSURE: 121 MMHG | HEART RATE: 73 BPM | WEIGHT: 174.9 LBS | TEMPERATURE: 99.2 F

## 2021-09-14 DIAGNOSIS — R87.613 HGSIL (HIGH GRADE SQUAMOUS INTRAEPITHELIAL LESION) ON PAP SMEAR OF CERVIX: Primary | ICD-10-CM

## 2021-09-14 DIAGNOSIS — C53.9 SQUAMOUS CELL CARCINOMA OF CERVIX (HCC): ICD-10-CM

## 2021-09-14 PROCEDURE — 99213 OFFICE O/P EST LOW 20 MIN: CPT | Performed by: OBSTETRICS & GYNECOLOGY

## 2021-09-14 RX ORDER — LANOLIN ALCOHOL/MO/W.PET/CERES
1000 CREAM (GRAM) TOPICAL DAILY
COMMUNITY
Start: 2021-08-20 | End: 2023-01-12

## 2021-09-14 RX ORDER — ALPRAZOLAM 0.5 MG/1
0.5 TABLET ORAL
COMMUNITY
End: 2023-01-12

## 2021-09-14 NOTE — PROGRESS NOTES
Age: 31 y.o.  Sex: female  :  1990  MRN: [id number]       REFERRING PHYSICIAN: Marcelino Villar MD  DATE OF VISIT: 2021        Betsy Puente presents to [facility] [place] [facility] for evaluation and management of history of cervical cancer. She previously had a LEEP at [facility] that showed FIGO 1A1 SCC. There was HGSIL at endocervical margin. She wants to retain fertility and has therefore had subsequent colpos etc. All have returned negative. She denies post coital bleeding or any symptoms related to recurrence.       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    • 20: LEEP   • 20: FIGO IA1 invasive squamous cell carcinoma, tumor size of 1.8cm, DOI 2.5mm, horizonal spread 7mm, All margins negative for malignancy, only HGSIL at endocervical margin. +carcinoma at margins from -3, and from -  • 20: PET W/CT- No local tumor is appreciated, within this patient resolution limitations of the PET scan. No definite metastatic disease. Small subcutaneous lesion in the right hepatic is favored to be inflammatory  • 20: Colposcopy-biopsy performed 12 o'clock  • 20: PATHOLOGY-Strips of benign endocervical glandular epithelium. Endocervical Curettage: No tissue identified   • 3/31/21: Pap smear-negative  • 21: Pap smear-negative    21: 3 month follow up           Past Medical History:  Past Medical History:   Diagnosis Date   • Allergic rhinitis    • GERD (gastroesophageal reflux disease)        Past Surgical History:  Past Surgical History:   Procedure Laterality Date   • LASIK Bilateral    • LEEP  2020        MEDICATIONS:    Current Outpatient Medications:   •  SUMAtriptan (IMITREX) 100 MG tablet, Take 1 tablet by mouth 1 (One) Time As Needed for Migraine (may repeat in 2 hrs.) for up to 1 dose., Disp: 27 tablet, Rfl: 3  •  ALPRAZolam (XANAX) 0.5 MG  "tablet, Take 0.5 mg by mouth., Disp: , Rfl:   •  vitamin B-12 (CYANOCOBALAMIN) 1000 MCG tablet, Take 1,000 mcg by mouth Daily., Disp: , Rfl:     Current Facility-Administered Medications:   •  patient supplied allergy injection, 0.15 mL, Subcutaneous, Once, Cull, Natalie C, APRN  •  patient supplied allergy injection, 0.15 mL, Subcutaneous, Weekly, Cull, Natalie C, APRN, 0.15 mL at 09/15/16 1538  •  patient supplied allergy injection, 0.15 mL, Subcutaneous, Weekly, Cull, Natalie C, APRN, 0.4 mL at 09/15/16 1536    ALLERGIES:  No Known Allergies      ROS:  CONSTITUTIONAL:  Denies fever or chills.   NEUROLOGIC:  Denies headache, focal weakness or sensory changes.   EYES:  Denies change in visual acuity.  HEENT:  Denies nasal congestion or sore throat.   RESPIRATORY:  Denies cough or shortness of breath.   CARDIOVASCULAR:  Denies chest pain or edema.   GI:  Denies abdominal pain, nausea, vomiting, bloody stools or diarrhea.   :  Denies dysuria, leaking or incontinence.  MUSCULOSKELETAL:  Denies back pain or joint pain.   INTEGUMENT:  Denies rash.   ENDOCRINE:  Denies polyuria or polydipsia.   LYMPHATIC:  Denies swollen glands or lymphedema.   PSYCHIATRIC:  Denies depression or anxiety.      PHYSICAL EXAM:  Vitals:    09/14/21 0817   BP: 121/83   Pulse: 73   Resp: 18   Temp: 99.2 °F (37.3 °C)   TempSrc: Temporal   SpO2: 97%   Weight: 79.3 kg (174 lb 14.4 oz)   Height: 157.5 cm (62\")  Comment: new patient height   PainSc: 0-No pain     Body mass index is 31.99 kg/m².  Current Status 9/14/2021   ECOG score 0     PHQ-9 Total Score: 0       GEN: alert and oriented x 3, normal affect, well nourished and hydrated.  CARDIO: regular rate and rhythm.  PULM: Lungs CTA b.l, no RRW   ABD: Soft, nontender, nondistended.   GYN: External genitalia normal, no lesions. Speculum with normal vagina, normal appearingcervix , small discoloration at 12 o'clock pap obtained today   Bimanual exam does not reveal any palpable lesions or " adnexal fullness  EXT: No petechiae, bruising, rash, candida. No CCE.       Result Review :  The pertinent labs, images, and/or pathology as noted in the oncology history were reviewed independently and discussed with the patient.   Original path from [facility] obtained and reviewed along with the addendum from [place].     Penny Smyth DO   09/14/2021    [facility] LABS:   WBC   Date Value Ref Range Status   01/30/2019 10.11 4.50 - 10.70 10*3/mm3 Final     RBC   Date Value Ref Range Status   01/30/2019 4.46 3.90 - 5.20 10*6/mm3 Final     Hemoglobin   Date Value Ref Range Status   01/30/2019 14.1 11.9 - 15.5 g/dL Final     Hematocrit   Date Value Ref Range Status   01/30/2019 43.6 35.6 - 45.5 % Final     Platelets   Date Value Ref Range Status   01/30/2019 199 140 - 500 10*3/mm3 Final     No results found for:     [facility] IMAGING:  No radiology results for the last 90 days.      ASSESSMENT :  • H/o FIGO Ia1 SCC - post LEEP  - Post CKC with HGSIL at endocervical margin only   - Negative PET   - Post colpo with ECC and paps negative x 2   • Desire to retain fertility           PLAN :  • Pt counseled regarding reportable symptoms   • Pap sent today   • Will call if abnormal and warrants intervention   • Otherwise return for surveillance in 3 months.             Penny Smyth D.O  9/14/2021    Gynecologic Oncology   [address] [place] [address]  [phone] office

## 2021-09-17 LAB
CYTOLOGIST CVX/VAG CYTO: NORMAL
CYTOLOGY CVX/VAG DOC CYTO: NORMAL
DX ICD CODE: NORMAL
HIV 1 & 2 AB SER-IMP: NORMAL
OTHER STN SPEC: NORMAL
STAT OF ADQ CVX/VAG CYTO-IMP: NORMAL

## 2021-12-14 ENCOUNTER — OFFICE VISIT (OUTPATIENT)
Dept: GYNECOLOGIC ONCOLOGY | Facility: CLINIC | Age: 31
End: 2021-12-14

## 2021-12-14 VITALS
BODY MASS INDEX: 32.39 KG/M2 | RESPIRATION RATE: 18 BRPM | OXYGEN SATURATION: 99 % | SYSTOLIC BLOOD PRESSURE: 135 MMHG | WEIGHT: 176 LBS | HEIGHT: 62 IN | DIASTOLIC BLOOD PRESSURE: 87 MMHG | HEART RATE: 71 BPM

## 2021-12-14 DIAGNOSIS — C53.9 SQUAMOUS CELL CARCINOMA OF CERVIX (HCC): Primary | ICD-10-CM

## 2021-12-14 PROCEDURE — 99213 OFFICE O/P EST LOW 20 MIN: CPT | Performed by: OBSTETRICS & GYNECOLOGY

## 2021-12-14 RX ORDER — INDOLE-3-CARBINOL
POWDER (GRAM) MISCELLANEOUS DAILY
COMMUNITY
End: 2023-01-12

## 2021-12-14 NOTE — PROGRESS NOTES
Age: 31 y.o.  Sex: female  :  1990  MRN: [id number]       REFERRING PHYSICIAN: No ref. provider found  DATE OF VISIT: 2021     Betsy Puente presents to [facility] for evaluation and management of history of cervical cancer. She previously had a LEEP at [facility] that showed FIGO 1A1 SCC. There was HGSIL at endocervical margin. She wants to retain fertility and has therefore had subsequent colpos etc. All have returned negative. She denies post coital bleeding or any symptoms related to recurrence. Today she reports increased cramping but no other symptoms. She does not currently have definitive reproductive plans.       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    • 20: LEEP   • 20: FIGO IA1 invasive squamous cell carcinoma, tumor size of 1.8cm, DOI 2.5mm, horizonal spread 7mm, All margins negative for malignancy, only HGSIL at endocervical margin. +carcinoma at margins from -3, and from -  • 20: PET W/CT- No local tumor is appreciated, within this patient resolution limitations of the PET scan. No definite metastatic disease. Small subcutaneous lesion in the right hepatic is favored to be inflammatory  • 20: Colposcopy-biopsy performed 12 o'clock  • 20: PATHOLOGY-Strips of benign endocervical glandular epithelium. Endocervical Curettage: No tissue identified   • 3/31/21: Pap smear-negative  • 21: Pap smear-negative  • 21: Pap smear-negative    21: 3 month follow up           Past Medical History:  Past Medical History:   Diagnosis Date   • Allergic rhinitis    • GERD (gastroesophageal reflux disease)        Past Surgical History:  Past Surgical History:   Procedure Laterality Date   • LASIK Bilateral    • LEEP  2020        MEDICATIONS:    Current Outpatient Medications:   •  ALPRAZolam (XANAX) 0.5 MG tablet, Take 0.5 mg by  "mouth., Disp: , Rfl:   •  SUMAtriptan (IMITREX) 100 MG tablet, Take 1 tablet by mouth 1 (One) Time As Needed for Migraine (may repeat in 2 hrs.) for up to 1 dose., Disp: 27 tablet, Rfl: 3  •  vitamin B-12 (CYANOCOBALAMIN) 1000 MCG tablet, Take 1,000 mcg by mouth Daily., Disp: , Rfl:   •  Indole-3-Carbinol powder, Daily., Disp: , Rfl:   •  MULTIPLE MINERALS-VITAMINS PO, Take  by mouth., Disp: , Rfl:   •  PROBIOTIC PRODUCT PO, Take 1 tablet by mouth Daily., Disp: , Rfl:     Current Facility-Administered Medications:   •  patient supplied allergy injection, 0.15 mL, Subcutaneous, Once, Ioana Rankinbeth C, APRN  •  patient supplied allergy injection, 0.15 mL, Subcutaneous, Weekly, Cull, Natalie C, APRN, 0.15 mL at 09/15/16 1538  •  patient supplied allergy injection, 0.15 mL, Subcutaneous, Weekly, Cull, Natalie C, APRN, 0.4 mL at 09/15/16 1536    ALLERGIES:  No Known Allergies      ROS:  CONSTITUTIONAL:  Denies fever or chills.   NEUROLOGIC:  Denies headache, focal weakness or sensory changes.   EYES:  Denies change in visual acuity.  HEENT:  Denies nasal congestion or sore throat.   RESPIRATORY:  Denies cough or shortness of breath.   CARDIOVASCULAR:  Denies chest pain or edema.   GI:  Denies abdominal pain, nausea, vomiting, bloody stools or diarrhea.   :  Denies dysuria, leaking or incontinence.  MUSCULOSKELETAL:  Denies back pain or joint pain.   INTEGUMENT:  Denies rash.   ENDOCRINE:  Denies polyuria or polydipsia.   LYMPHATIC:  Denies swollen glands or lymphedema.   PSYCHIATRIC:  Denies depression or anxiety.      PHYSICAL EXAM:  Vitals:    12/14/21 0808   BP: 135/87   Pulse: 71   Resp: 18   SpO2: 99%   Weight: 79.8 kg (176 lb)   Height: 157.5 cm (62.01\")   PainSc: 0-No pain     Body mass index is 32.18 kg/m².  Current Status 12/14/2021   ECOG score 0     PHQ-9 Total Score:         GEN: alert and oriented x 3, normal affect, well nourished and hydrated.  CARDIO: regular rate and rhythm.  PULM: Lungs CTA b.l, " no RRW   ABD: Soft, nontender, nondistended.   GYN: External genitalia normal, no lesions. Speculum with normal vagina, normal appearing cervix, pap obtained today.   Bimanual exam does not reveal any palpable lesions or adnexal fullness  EXT: No petechiae, bruising, rash, candida. No CCE.       Result Review :  The pertinent labs, images, and/or pathology as noted in the oncology history were reviewed independently and discussed with the patient.   Original path from [facility] obtained and reviewed along with the addendum from [place].     Penny Smyth,    09/14/2021    [facility]:   WBC   Date Value Ref Range Status   01/30/2019 10.11 4.50 - 10.70 10*3/mm3 Final     RBC   Date Value Ref Range Status   01/30/2019 4.46 3.90 - 5.20 10*6/mm3 Final     Hemoglobin   Date Value Ref Range Status   01/30/2019 14.1 11.9 - 15.5 g/dL Final     Hematocrit   Date Value Ref Range Status   01/30/2019 43.6 35.6 - 45.5 % Final     Platelets   Date Value Ref Range Status   01/30/2019 199 140 - 500 10*3/mm3 Final     No results found for:     [facility]:  No radiology results for the last 90 days.      ASSESSMENT :  • H/o FIGO Ia1 SCC - post LEEP  - Post CKC with HGSIL at endocervical margin only   - Negative PET   - Post colpo with ECC and paps negative x 2   • New pelvic pain/cramping   • Desire to retain fertility           PLAN :  • Pap sent today   • Obtain CT scan pelvis to eval new cause of pain and cramping.  • Will call if abnormal and warrants intervention , possible EUA with D and C.   • Otherwise return for surveillance in 3 months.             Penny Smyth D.O  12/14/2021    Gynecologic Oncology   [address] [place] [address] [address] [place] [address]  [phone] office

## 2021-12-28 ENCOUNTER — APPOINTMENT (OUTPATIENT)
Dept: CT IMAGING | Facility: HOSPITAL | Age: 31
End: 2021-12-28

## 2022-01-04 PROCEDURE — U0004 COV-19 TEST NON-CDC HGH THRU: HCPCS | Performed by: FAMILY MEDICINE

## 2022-01-10 ENCOUNTER — APPOINTMENT (OUTPATIENT)
Dept: ULTRASOUND IMAGING | Facility: HOSPITAL | Age: 32
End: 2022-01-10

## 2022-03-07 NOTE — PROGRESS NOTES
Age: 32 y.o.  Sex: female  :  1990  MRN: [id number]       REFERRING PHYSICIAN: No ref. provider found  DATE OF VISIT: 3/7/2022     Betsy Puente presents to [facility] for evaluation and management of history of cervical cancer. She previously had a LEEP at [facility] that showed FIGO 1A1 SCC. There was HGSIL at endocervical margin. She wants to retain fertility and has therefore had subsequent colpos, etc. All have returned negative.   At last visit on 21, pt was complaining of some cramping pain, however she states this has since resolved.  CT was ordered at that time, secondary to cramping, however pt did not complete this secondary to financial cost.  She has no complaints today.  She denies post coital bleeding or any symptoms related to recurrence.      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    • 20: LEEP   • 20: FIGO IA1 invasive squamous cell carcinoma, tumor size of 1.8cm, DOI 2.5mm, horizonal spread 7mm, All margins negative for malignancy, only HGSIL at endocervical margin. +carcinoma at margins from -3, and from -  • 20: PET W/CT- No local tumor is appreciated, within this patient resolution limitations of the PET scan. No definite metastatic disease. Small subcutaneous lesion in the right hepatic is favored to be inflammatory  • 20: Colposcopy-biopsy performed 12 o'clock  • 20: PATHOLOGY-Strips of benign endocervical glandular epithelium. Endocervical Curettage: No tissue identified   • 3/31/21: Pap smear-negative  • 21: Pap smear-negative  • 21: Pap smear-negative    21: 3 month follow up           Past Medical History:  Past Medical History:   Diagnosis Date   • Allergic rhinitis    • GERD (gastroesophageal reflux disease)        Past Surgical History:  Past Surgical History:   Procedure Laterality Date   • LASIK  Bilateral    • LEEP  11/2020        MEDICATIONS:    Current Outpatient Medications:   •  ALPRAZolam (XANAX) 0.5 MG tablet, Take 0.5 mg by mouth., Disp: , Rfl:   •  Ascorbic Acid (VITAMIN C PO), Take  by mouth., Disp: , Rfl:   •  Indole-3-Carbinol powder, Daily., Disp: , Rfl:   •  MULTIPLE MINERALS-VITAMINS PO, Take  by mouth., Disp: , Rfl:   •  PROBIOTIC PRODUCT PO, Take 1 tablet by mouth Daily., Disp: , Rfl:   •  SUMAtriptan (IMITREX) 100 MG tablet, Take 1 tablet by mouth 1 (One) Time As Needed for Migraine (may repeat in 2 hrs.) for up to 1 dose., Disp: 27 tablet, Rfl: 3  •  vitamin B-12 (CYANOCOBALAMIN) 1000 MCG tablet, Take 1,000 mcg by mouth Daily., Disp: , Rfl:     Current Facility-Administered Medications:   •  patient supplied allergy injection, 0.15 mL, Subcutaneous, Once, Natalie Rankin, APRN  •  patient supplied allergy injection, 0.15 mL, Subcutaneous, Weekly, Natalie Rankin, APRN, 0.15 mL at 09/15/16 1538  •  patient supplied allergy injection, 0.15 mL, Subcutaneous, Weekly, Natalie Rankin, APRN, 0.4 mL at 09/15/16 1536    ALLERGIES:  No Known Allergies      ROS:  CONSTITUTIONAL:  Denies fever or chills.   NEUROLOGIC:  Denies headache, focal weakness or sensory changes.   EYES:  Denies change in visual acuity.  HEENT:  Denies nasal congestion or sore throat.   RESPIRATORY:  Denies cough or shortness of breath.   CARDIOVASCULAR:  Denies chest pain or edema.   GI:  Denies abdominal pain, nausea, vomiting, bloody stools or diarrhea.   :  Denies dysuria, leaking or incontinence.  MUSCULOSKELETAL:  Denies back pain or joint pain.   INTEGUMENT:  Denies rash.   ENDOCRINE:  Denies polyuria or polydipsia.   LYMPHATIC:  Denies swollen glands or lymphedema.   PSYCHIATRIC:  Denies depression or anxiety.      PHYSICAL EXAM:  There were no vitals filed for this visit.  There is no height or weight on file to calculate BMI.  Current Status 12/14/2021   ECOG score 0     PHQ-9 Total Score:         GEN: alert  and oriented x 3, normal affect, well nourished and hydrated.  CARDIO: regular rate and rhythm.  PULM: Lungs CTA b.l, no RRW   ABD: Soft, nontender, nondistended.   GYN: External genitalia normal, no lesions. Speculum with normal vagina, normal appearing cervix, Pap obtained   Bimanual exam does not reveal any palpable lesions or adnexal fullness  EXT: No petechiae, bruising, rash, candida. No CCE.       Result Review :  The pertinent labs, images, and/or pathology as noted in the oncology history were reviewed independently and discussed with the patient.   Original path from [facility] obtained and reviewed along with the addendum from [facility].     Penny YUAN DO Haja   09/14/2021    [facility] LABS:   WBC   Date Value Ref Range Status   01/30/2019 10.11 4.50 - 10.70 10*3/mm3 Final     RBC   Date Value Ref Range Status   01/30/2019 4.46 3.90 - 5.20 10*6/mm3 Final     Hemoglobin   Date Value Ref Range Status   01/30/2019 14.1 11.9 - 15.5 g/dL Final     Hematocrit   Date Value Ref Range Status   01/30/2019 43.6 35.6 - 45.5 % Final     Platelets   Date Value Ref Range Status   01/30/2019 199 140 - 500 10*3/mm3 Final     No results found for:     [facility] IMAGING:  No radiology results for the last 90 days.      ASSESSMENT :  • H/o FIGO Ia1 SCC - post LEEP  - Post CKC with HGSIL at endocervical margin only   - Negative PET   - Post colpo with ECC and paps negative x 2   • pelvic pain/cramping- resolved  • Desire to retain fertility       PLAN :  • PAP smear today- will call with result  • Repeat CT A/P- pt concerned about financial cost of CT, will have billing reach out about financial aid/scholarship  • Surveillance visit in three months  • Call sooner with questions, concerns, new symptoms            Melody Mary PA-C  3/7/2022    Gynecologic Oncology   [address] [place] [id number] [phone] office

## 2022-03-09 ENCOUNTER — OFFICE VISIT (OUTPATIENT)
Dept: GYNECOLOGIC ONCOLOGY | Facility: CLINIC | Age: 32
End: 2022-03-09

## 2022-03-09 VITALS
SYSTOLIC BLOOD PRESSURE: 131 MMHG | HEIGHT: 60 IN | OXYGEN SATURATION: 95 % | BODY MASS INDEX: 36.06 KG/M2 | HEART RATE: 77 BPM | TEMPERATURE: 97.1 F | DIASTOLIC BLOOD PRESSURE: 90 MMHG | WEIGHT: 183.7 LBS

## 2022-03-09 DIAGNOSIS — C53.9 SQUAMOUS CELL CARCINOMA OF CERVIX: Primary | ICD-10-CM

## 2022-03-09 PROCEDURE — 99213 OFFICE O/P EST LOW 20 MIN: CPT | Performed by: PHYSICIAN ASSISTANT

## 2022-03-15 ENCOUNTER — APPOINTMENT (OUTPATIENT)
Dept: CT IMAGING | Facility: HOSPITAL | Age: 32
End: 2022-03-15

## 2022-03-16 ENCOUNTER — TELEPHONE (OUTPATIENT)
Dept: GYNECOLOGIC ONCOLOGY | Facility: CLINIC | Age: 32
End: 2022-03-16

## 2022-03-16 DIAGNOSIS — C53.9 SQUAMOUS CELL CARCINOMA OF CERVIX: Primary | ICD-10-CM

## 2022-06-04 NOTE — PROGRESS NOTES
Subjective   Betsy Simon is a 28 y.o. female. Would like to get refills on her migraine medications. She is also having some burning and wants to make sure she does not have UTI.      Migraine    This is a chronic problem. The current episode started more than 1 year ago. The problem has been unchanged. The pain is located in the frontal region. Radiates to: back of the head. The pain quality is similar to prior headaches. The quality of the pain is described as aching, throbbing, sharp and stabbing. The pain is moderate. Associated symptoms include phonophobia, photophobia and a visual change (stars). Associated symptoms comments: Auras  . Exacerbated by: smells. She has tried triptans and beta blockers for the symptoms. The treatment provided significant relief. Her past medical history is significant for migraine headaches.   Difficulty Urinating   This is a new problem. The current episode started yesterday. The problem occurs intermittently. The problem has been unchanged. Associated symptoms include headaches, urinary symptoms and a visual change (stars). Nothing aggravates the symptoms. She has tried nothing for the symptoms. The treatment provided no relief.        The following portions of the patient's history were reviewed and updated as appropriate: allergies, current medications, past family history, past medical history, past social history, past surgical history and problem list.    Review of Systems   Constitutional: Negative.    Eyes: Positive for photophobia.   Respiratory: Negative.    Cardiovascular: Negative.    Genitourinary: Positive for difficulty urinating.   Neurological: Positive for headache.       Objective   Physical Exam   Constitutional: She is oriented to person, place, and time. Vital signs are normal. She appears well-developed and well-nourished. She is cooperative.   Eyes: Conjunctivae and EOM are normal. Pupils are equal, round, and reactive to light.   Cardiovascular:  "Normal rate, regular rhythm and normal heart sounds.    Pulmonary/Chest: Effort normal and breath sounds normal.   Neurological: She is alert and oriented to person, place, and time.   Psychiatric: She has a normal mood and affect. Her speech is normal and behavior is normal. Judgment and thought content normal. Cognition and memory are normal.   Nursing note and vitals reviewed.    Vitals:    06/05/18 1605   BP: 116/64   Pulse: 67   Temp: 98.4 °F (36.9 °C)   TempSrc: Oral   SpO2: 98%   Weight: 62.6 kg (138 lb)   Height: 152.4 cm (60\")     Results for orders placed or performed in visit on 06/05/18   POC Urinalysis Dipstick, Automated   Result Value Ref Range    Color Yellow Yellow, Straw, Dark Yellow, Nay    Clarity, UA Clear Clear    Specific Gravity  1.015 1.005 - 1.030    pH, Urine 8.0 5.0 - 8.0    Leukocytes Negative Negative    Nitrite, UA Negative Negative    Protein, POC Negative Negative mg/dL    Glucose, UA Negative Negative, 1000 mg/dL (3+) mg/dL    Ketones, UA 15 mg/dL (A) Negative    Urobilinogen, UA Normal Normal    Bilirubin Negative Negative    Blood, UA Negative Negative   POC Pregnancy, Urine   Result Value Ref Range    HCG, Urine, QL Negative Negative    Lot Number [id number]     Internal Positive Control Positive     Internal Negative Control Negative        Assessment/Plan   Diagnoses and all orders for this visit:    Encounter for gynecological examination with Papanicolaou smear of cervix  -     Pap IG, Ct-Ng TV Rfx HPV ASCU  -     NuSwab VG+ & HSV    Dysuria  -     NuSwab VG+ & HSV  -     POC Urinalysis Dipstick, Automated    Encounter for initial prescription of contraceptives, unspecified contraceptive  -     POC Pregnancy, Urine  -     drospirenone-ethinyl estradiol (YAMILA 28) 3-0.03 MG per tablet; Take 1 tablet by mouth Daily.    Migraine with aura and with status migrainosus, not intractable  -     propranolol (INDERAL) 20 MG tablet; Take one tablet two times daily  -     SUMAtriptan " (IMITREX) 50 MG tablet; Take 2 tablets by mouth 1 (One) Time As Needed for Migraine (may repeat in 2 hrs.) for up to 1 dose.    Refill Inderal and Sumatriptan as seems to control headaches.  UA negative, may be some vaginitis. Will await pap results.               For information on Fall & Injury Prevention, visit: [email or web address] OR  [email or web address] OR  https://www.cdc.gov/steadi/patient.html

## 2022-06-06 ENCOUNTER — TELEPHONE (OUTPATIENT)
Dept: GYNECOLOGIC ONCOLOGY | Age: 32
End: 2022-06-06

## 2022-06-06 NOTE — TELEPHONE ENCOUNTER
Caller: Betsy Puente    Relationship to patient: Self    Best call back number: [phone]    Chief complaint: PATIENT STATES SHE WAS SCHEDULED FOR 6.8.22 AND RESCHED TO 6/13/22 BUT WAS NOT NOTIFIED OF APPT CHANGE. REQUESTING CALL BACK  \

## 2022-06-10 NOTE — PROGRESS NOTES
Age: 32 y.o.  Sex: female  :  1990  MRN: [id number]       REFERRING PHYSICIAN: No ref. provider found  DATE OF VISIT: 2022     Betsy Puente presents to [facility] for evaluation and management of history of cervical cancer. She previously had a LEEP at [facility] that showed FIGO 1A1 SCC. There was HGSIL at endocervical margin. She wants to retain fertility and has therefore had subsequent colpos- all have returned negative.   She was last seen on 3/9/22 and doing well at that time. CT ordered in 2021 and again in March however pt did not complete this secondary to financial cost.  She has no complaints today.  She denies post coital bleeding or any symptoms related to recurrence.       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    • 20: LEEP   • 20: FIGO IA1 invasive squamous cell carcinoma, tumor size of 1.8cm, DOI 2.5mm, horizonal spread 7mm, All margins negative for malignancy, only HGSIL at endocervical margin. +carcinoma at margins from -3, and from -  • 20: PET W/CT- No local tumor is appreciated, within this patient resolution limitations of the PET scan. No definite metastatic disease. Small subcutaneous lesion in the right hepatic is favored to be inflammatory  • 20: Colposcopy-biopsy performed 12 o'clock  • 20: PATHOLOGY-Strips of benign endocervical glandular epithelium. Endocervical Curettage: No tissue identified   • 3/31/21: Pap smear-negative  • 21: Pap smear-negative  • 21: Pap smear-negative  • 21: Pap smear-negative  • 22: Pap smear-negative    22: 3 month follow up         Past Medical History:  Past Medical History:   Diagnosis Date   • Allergic rhinitis    • GERD (gastroesophageal reflux disease)        Past Surgical History:  Past Surgical History:   Procedure Laterality Date   • LASIK  "Bilateral    • LEEP  11/2020        MEDICATIONS:    Current Outpatient Medications:   •  ALPRAZolam (XANAX) 0.5 MG tablet, Take 0.5 mg by mouth., Disp: , Rfl:   •  Ascorbic Acid (VITAMIN C PO), Take  by mouth., Disp: , Rfl:   •  Indole-3-Carbinol powder, Daily., Disp: , Rfl:   •  MULTIPLE MINERALS-VITAMINS PO, Take  by mouth., Disp: , Rfl:   •  PROBIOTIC PRODUCT PO, Take 1 tablet by mouth Daily., Disp: , Rfl:   •  SUMAtriptan (IMITREX) 100 MG tablet, Take 1 tablet by mouth 1 (One) Time As Needed for Migraine (may repeat in 2 hrs.) for up to 1 dose., Disp: 27 tablet, Rfl: 3  •  vitamin B-12 (CYANOCOBALAMIN) 1000 MCG tablet, Take 1,000 mcg by mouth Daily., Disp: , Rfl:   No current facility-administered medications for this visit.    ALLERGIES:  No Known Allergies      ROS:  CONSTITUTIONAL:  Denies fever or chills.   NEUROLOGIC:  Denies headache, focal weakness or sensory changes.   EYES:  Denies change in visual acuity.  HEENT:  Denies nasal congestion or sore throat.   RESPIRATORY:  Denies cough or shortness of breath.   CARDIOVASCULAR:  Denies chest pain or edema.   GI:  Denies abdominal pain, nausea, vomiting, bloody stools or diarrhea.   :  Denies dysuria, leaking or incontinence.  MUSCULOSKELETAL:  Denies back pain or joint pain.   INTEGUMENT:  Denies rash.   ENDOCRINE:  Denies polyuria or polydipsia.   LYMPHATIC:  Denies swollen glands or lymphedema.   PSYCHIATRIC:  Denies depression or anxiety.      PHYSICAL EXAM:  Vitals:    06/13/22 0838   BP: 132/85   Pulse: 70   Resp: 18   Temp: 97.9 °F (36.6 °C)   TempSrc: Oral   SpO2: 98%   Weight: 83.2 kg (183 lb 8 oz)   Height: 152.4 cm (60\")   PainSc: 0-No pain     Body mass index is 35.84 kg/m².  Current Status 6/13/2022   ECOG score 0     PHQ-9 Total Score:         GEN: alert and oriented x 3, normal affect, well nourished and hydrated.  CARDIO: regular rate and rhythm.  PULM: Lungs CTA b.l, no RRW   ABD: Soft, nontender, nondistended.   GYN: External genitalia " normal, no lesions. Speculum with normal vagina, normal appearing cervix, Pap obtained   Bimanual exam does not reveal any palpable lesions or adnexal fullness  EXT: No petechiae, bruising, rash, candida. No CCE.       Result Review :  The pertinent labs, images, and/or pathology as noted in the oncology history were reviewed independently and discussed with the patient.   Original path from [facility] obtained and reviewed along with the addendum from [facility].     Melody Mary PA-C   09/14/2021    [facility]:   WBC   Date Value Ref Range Status   01/30/2019 10.11 4.50 - 10.70 10*3/mm3 Final     RBC   Date Value Ref Range Status   01/30/2019 4.46 3.90 - 5.20 10*6/mm3 Final     Hemoglobin   Date Value Ref Range Status   01/30/2019 14.1 11.9 - 15.5 g/dL Final     Hematocrit   Date Value Ref Range Status   01/30/2019 43.6 35.6 - 45.5 % Final     Platelets   Date Value Ref Range Status   01/30/2019 199 140 - 500 10*3/mm3 Final     No results found for:     [facility]:  No radiology results for the last 90 days.      ASSESSMENT :  • H/o FIGO Ia1 SCC - post LEEP  - Post CKC with HGSIL at endocervical margin only   - Negative PET   - Post colpo with ECC and paps negative x 2   • Desire to retain fertility       PLAN :  • PAP smear today- will call with result  • Pt will call if insurance changes/able to have CT  • Surveillance visit in three months  • Call sooner with questions, concerns, new symptoms            Melody Mary PA-C  6/13/2022    Gynecologic Oncology   [address] [place] [address]  [phone] office

## 2022-06-13 ENCOUNTER — OFFICE VISIT (OUTPATIENT)
Dept: GYNECOLOGIC ONCOLOGY | Facility: CLINIC | Age: 32
End: 2022-06-13

## 2022-06-13 VITALS
HEIGHT: 60 IN | OXYGEN SATURATION: 98 % | WEIGHT: 183.5 LBS | TEMPERATURE: 97.9 F | BODY MASS INDEX: 36.02 KG/M2 | DIASTOLIC BLOOD PRESSURE: 85 MMHG | RESPIRATION RATE: 18 BRPM | HEART RATE: 70 BPM | SYSTOLIC BLOOD PRESSURE: 132 MMHG

## 2022-06-13 DIAGNOSIS — C53.9 SQUAMOUS CELL CARCINOMA OF CERVIX: Primary | ICD-10-CM

## 2022-06-13 PROCEDURE — 99213 OFFICE O/P EST LOW 20 MIN: CPT | Performed by: PHYSICIAN ASSISTANT

## 2022-06-16 LAB
CYTOLOGIST CVX/VAG CYTO: NORMAL
CYTOLOGY CVX/VAG DOC CYTO: NORMAL
DX ICD CODE: NORMAL
HIV 1 & 2 AB SER-IMP: NORMAL
Lab: NORMAL
OTHER STN SPEC: NORMAL
STAT OF ADQ CVX/VAG CYTO-IMP: NORMAL

## 2022-06-17 ENCOUNTER — TELEPHONE (OUTPATIENT)
Dept: GYNECOLOGIC ONCOLOGY | Facility: CLINIC | Age: 32
End: 2022-06-17

## 2022-06-17 NOTE — TELEPHONE ENCOUNTER
----- Message from Melody Mary PA-C sent at 6/16/2022  4:39 PM EDT -----  Regarding: FW:  Please let pt know PAP Smear was normal  ----- Message -----  From: Mary Kate Lang Results In  Sent: 6/16/2022   3:28 PM EDT  To: Melody Mary PA-C

## 2022-06-17 NOTE — TELEPHONE ENCOUNTER
MA spoke with the pt to give her pap smear results. Pt verbalized understanding and thanked us for calling.

## 2022-09-21 ENCOUNTER — OFFICE VISIT (OUTPATIENT)
Dept: GYNECOLOGIC ONCOLOGY | Facility: CLINIC | Age: 32
End: 2022-09-21

## 2022-09-21 VITALS
RESPIRATION RATE: 20 BRPM | HEART RATE: 66 BPM | SYSTOLIC BLOOD PRESSURE: 138 MMHG | BODY MASS INDEX: 35.47 KG/M2 | OXYGEN SATURATION: 98 % | TEMPERATURE: 98 F | WEIGHT: 180.7 LBS | DIASTOLIC BLOOD PRESSURE: 90 MMHG | HEIGHT: 60 IN

## 2022-09-21 DIAGNOSIS — C53.9 SQUAMOUS CELL CARCINOMA OF CERVIX: Primary | ICD-10-CM

## 2022-09-21 PROCEDURE — 99213 OFFICE O/P EST LOW 20 MIN: CPT | Performed by: PHYSICIAN ASSISTANT

## 2022-09-21 NOTE — PROGRESS NOTES
Age: 32 y.o.  Sex: female  :  1990  MRN: [id number]       REFERRING PHYSICIAN: No ref. provider found  DATE OF VISIT: 2022     Betsy Puente presents to [facility] for evaluation and management of history of cervical cancer. She previously had a LEEP at [facility] that showed FIGO 1A1 SCC. There was HGSIL at endocervical margin. She wants to retain fertility and has therefore had subsequent colpos- all have returned negative.   She was last seen on 22 and doing well at that time.  She denies post coital bleeding or any symptoms related to recurrence.  No recent CT secondary to pt stating insurance will not cover the cost and she is unable to pay out of pocket.      I have reviewed the HPI and pt has no new complaints today from visit on 22- Melody Mary PA-C      Oncology/Hematology History Overview Note   Besty Puente is a 31 yr/o female here for ongoing surveillance of FIGO IA1 squamous cell carcinoma.    • 20: LEEP   • 20: FIGO IA1 invasive squamous cell carcinoma, tumor size of 1.8cm, DOI 2.5mm, horizonal spread 7mm, All margins negative for malignancy, only HGSIL at endocervical margin. +carcinoma at margins from -3, and from -  • 20: PET W/CT- No local tumor is appreciated, within this patient resolution limitations of the PET scan. No definite metastatic disease. Small subcutaneous lesion in the right hepatic is favored to be inflammatory  • 20: Colposcopy-biopsy performed 12 o'clock  • 20: PATHOLOGY-Strips of benign endocervical glandular epithelium. Endocervical Curettage: No tissue identified   • 3/31/21: Pap smear-negative  • 21: Pap smear-negative  • 21: Pap smear-negative  • 21: Pap smear-negative  • 22: Pap smear-negative  • 22: Pap smear - negative       22: 3 Month Surveillance Visit          Past Medical History:  Past Medical History:   Diagnosis Date   • Allergic rhinitis    • GERD  "(gastroesophageal reflux disease)        Past Surgical History:  Past Surgical History:   Procedure Laterality Date   • LASIK Bilateral    • LEEP  11/2020        MEDICATIONS:    Current Outpatient Medications:   •  ALPRAZolam (XANAX) 0.5 MG tablet, Take 0.5 mg by mouth., Disp: , Rfl:   •  Ascorbic Acid (VITAMIN C PO), Take  by mouth., Disp: , Rfl:   •  Indole-3-Carbinol powder, Daily., Disp: , Rfl:   •  MULTIPLE MINERALS-VITAMINS PO, Take  by mouth., Disp: , Rfl:   •  PROBIOTIC PRODUCT PO, Take 1 tablet by mouth Daily., Disp: , Rfl:   •  SUMAtriptan (IMITREX) 100 MG tablet, Take 1 tablet by mouth 1 (One) Time As Needed for Migraine (may repeat in 2 hrs.) for up to 1 dose., Disp: 27 tablet, Rfl: 3  •  vitamin B-12 (CYANOCOBALAMIN) 1000 MCG tablet, Take 1,000 mcg by mouth Daily., Disp: , Rfl:     ALLERGIES:  No Known Allergies      ROS:  As above, otherwise normal      PHYSICAL EXAM:  Vitals:    09/21/22 0807   BP: 138/90   Pulse: 66   Resp: 20   Temp: 98 °F (36.7 °C)   TempSrc: Temporal   SpO2: 98%   Weight: 82 kg (180 lb 11.2 oz)   Height: 152.4 cm (60\")   PainSc: 0-No pain     Body mass index is 35.29 kg/m².  Current Status 9/21/2022   ECOG score 0     PHQ-9 Total Score:         GEN: alert and oriented x 3, normal affect, well nourished and hydrated, anxious  GYN: External genitalia normal, no lesions. Speculum with normal vagina, normal appearing cervix, milky white discharge noted at os, no blood, Pap obtained; Bimanual exam does not reveal any palpable lesions or adnexal fullness        Result Review :  The pertinent labs, images, and/or pathology as noted in the oncology history were reviewed independently and discussed with the patient.   Original path from [facility] obtained and reviewed along with the addendum from [facility].     Melody Mary PA-C   09/14/2021    [facility] LABS:   WBC   Date Value Ref Range Status   01/30/2019 10.11 4.50 - 10.70 10*3/mm3 Final     RBC   Date Value Ref Range Status   01/30/2019 4.46 " 3.90 - 5.20 10*6/mm3 Final     Hemoglobin   Date Value Ref Range Status   01/30/2019 14.1 11.9 - 15.5 g/dL Final     Hematocrit   Date Value Ref Range Status   01/30/2019 43.6 35.6 - 45.5 % Final     Platelets   Date Value Ref Range Status   01/30/2019 199 140 - 500 10*3/mm3 Final     No results found for:     [facility] IMAGING:  No radiology results for the last 90 days.      ASSESSMENT :  • H/o FIGO Ia1 SCC - post LEEP  - Post CKC with HGSIL at endocervical margin only   - Negative PET   - Post colpo with ECC, all recent pap smears negative  - Exam as above, PAP obtained today  • Desire to retain fertility       PLAN :  • Will call with PAP smear result  • Will try to get CT approved through insurance as she has not had imaging in one year  • Surveillance visit in three months  • Call sooner with questions, concerns, new symptoms; reportable symptoms reviewed    No significant changes to A/P from last visit on 6/13- RICHELLE Hayward PA-C  9/21/2022    Gynecologic Oncology   [address] [place] [address]  [phone] office

## 2022-09-22 ENCOUNTER — PATIENT ROUNDING (BHMG ONLY) (OUTPATIENT)
Dept: GYNECOLOGIC ONCOLOGY | Facility: CLINIC | Age: 32
End: 2022-09-22

## 2022-09-22 NOTE — PROGRESS NOTES
A My-Chart message has been sent to the patient for PATIENT ROUNDING with [facility]

## 2022-11-13 ENCOUNTER — E-VISIT (OUTPATIENT)
Dept: FAMILY MEDICINE CLINIC | Facility: TELEHEALTH | Age: 32
End: 2022-11-13
Payer: COMMERCIAL

## 2022-11-13 PROCEDURE — BRIGHTMDVISIT: Performed by: NURSE PRACTITIONER

## 2022-11-14 NOTE — EXTERNAL PATIENT INSTRUCTIONS
View Doctor's Note     Note   I have prescribed Pen v k and diflucan. If symptoms do not improve or become worse, follow up with your PCP   Diagnosis   Strep pharyngitis (strep throat)   My name is Kelly Cruz, and I'm a healthcare provider at [facility]. I reviewed your interview, and I see that you have strep throat. This can be a painful condition, but it responds well to treatment.   To prevent the spread of illness to others, I recommend that you stay home and away from other people as much as possible while you're sick. When you need to be around other people, consider wearing a face mask.   I've given you a doctor's note for 3 days.   Medications   Your pharmacy   [facility] [id number] [place] [address] [address] [place] [address] [phone]     Prescription   Fluconazole (150mg): Take 1 tablet by mouth once for 1 day as a single dose. Use this medication only if you develop a yeast infection. If yeast infection symptoms are still present 3 days after taking the first tablet, take the second tablet.   Penicillin V Potassium (500mg): Take 1 tablet by mouth twice a day for 10 days for infection. This medication is an antibiotic. Take it exactly as directed. You must finish the entire course of medication, even if you feel better after the first few days of treatment.    Start taking the antibiotics I've prescribed right away. You need to finish the entire course of antibiotics, even if you start to feel better before the pills run out.    Some people develop a yeast infection after taking antibiotics. If you get a yeast infection, you can treat it with Diflucan (fluconazole), an oral antifungal prescribed here.   About your diagnosis   Strep throat is an infection in the throat and tonsils caused by group A streptococcus bacteria. Strep throat is most often spread by droplets that are released into the air after an infected person coughs or sneezes. You can also get strep throat by sharing cups or utensils  "with an infected person.   Symptoms usually begin within 2 to 5 days after a person has been exposed. The pain from strep throat usually starts quickly and can be severe, especially when swallowing. Body aches and pains are common.   What to expect   If you follow this treatment plan, you should start to feel better within a few days.   When to seek care   Call us at [phone]   with any sudden or unexpected symptoms.    Symptoms that don't improve within 48 hours of starting antibiotics.    Symptoms that get better for a few days and then suddenly get worse.    Worsening fever.    Your throat pain becomes worse and makes swallowing extremely difficult or impossible.    Muffled voice (it may sound like you are talking with a mouthful of food).    Difficulty opening your mouth or jaw.    Stiff neck.    Joint pain, or swelling that moves from joint to joint.    Severe stomach pain.    Shortness of breath.    Nosebleed.    Severe fatigue.    A new rash.    Odd emotional or behavioral changes.    Uncontrollable body movements or \"jerkiness.\"    Severe chest pain.   Other treatment    Rest and drink plenty of water.    Choose throat-friendly liquids and foods, such as lemon tea, broth, applesauce, frozen yogurt, or popsicles.    Gargle with salt water several times a day to help with your throat pain.    Try cough drops and throat lozenges for extra relief.    Stir a teaspoon of honey into warm water or weak tea to help soothe a sore throat.    Avoid smoke and air pollution. Smoke can make infections worse.   Prevention    Avoid close contact with other people when you're sick.    Cover your mouth and nose when you cough or sneeze. Use a tissue or cough into your elbow. Make sure that used tissues go directly into the trash.    Avoid touching your eyes, nose, or mouth while you're sick.    Wash your hands often, especially after coughing, sneezing, or blowing your nose. If soap and water are not available, use an " alcohol-based hand .    If you or someone in your home or workplace is sick, disinfect commonly used items. This includes door handles, tables, computers, remotes, and pens.    Coronavirus (COVID-19) information   Common symptoms of COVID-19 include fever, cough, shortness of breath, fatigue, muscle or body aches, headaches, new loss of sense of taste or smell, sore throat, stuffy or runny nose, nausea or vomiting, and diarrhea. Most people who get COVID-19 have mild symptoms and can rest at home until they get better. Elderly people and those with chronic medical problems may be at risk for more serious complications.   FAQs about the COVID-19 vaccine   There are four authorized COVID-19 vaccines: Rei & Lemon's Maury Vaccine (J&J/Maury), Moderna, Novavax, and Pfizer-BioNTech (Pfizer). The J&J/Maury and Novavax vaccines are approved for use in people aged 18 and older. The Moderna and Pfizer vaccines are approved for those aged 6 months and older. All four are available at no cost. Even if you don't have health insurance, you can still get the COVID-19 vaccine for free.   Which vaccine is the best? Which vaccine should I get?   All four vaccines are highly effective. Even if you get COVID-19 after being vaccinated, all of the vaccines help prevent severe disease, hospitalization, and complications.   Most people should get whichever vaccine is first available to them. However, women younger than 50 years old should consider the rare risk of blood clots with low platelets after vaccination with the J&J/Maury vaccine. This risk hasn't been seen with the other three vaccines.   Are the vaccines safe?   Yes. Hundreds of millions of people in the [place] have already safely received COVID-19 vaccines under the most intense safety monitoring in the history of the [place].   Do I need the vaccine if I've already had COVID?   Yes. Vaccination helps protect you even if you've already had COVID.   If you had  COVID-19 and had symptoms, wait to get vaccinated until you've recovered and completed your isolation period.   If you tested positive for COVID-19 but did not have symptoms, you can get vaccinated after 5 full days have passed since you had a positive test, as long as you don't develop symptoms.   How many doses of the vaccine do I need?   Visit www.cdc.gov/coronavirus/2019-ncov/vaccines/stay-up-to-date.html   to find out how to stay up to date with your COVID-19 vaccines.   I'm immunocompromised. How many doses of the vaccine do I need?   For information on how immunocompromised people can stay up to date with their COVID-19 vaccines, visit www.cdc.gov/coronavirus/2019-ncov/vaccines/recommendations/immuno.html  .   What are the common side effects of the vaccine?   A sore arm, tiredness, headache, and muscle pain may occur within two days of getting the vaccine and last a day or two. For the Moderna or Pfizer vaccines, side effects are more common after the second dose. People over the age of 55 are less likely to have side effects than younger people.   After I'm up to date on vaccines, can I still get or spread COVID?   Yes, you can still get COVID, but your disease should be milder. And your risk of serious illness, hospitalization, and complications will be much lower, especially if you're up to date. Unfortunately, you can still spread COVID if you've been vaccinated. That's why it's important to follow isolation guidelines if you get sick or test positive.   After I'm up to date on vaccines, can I go back to normal?   You should still wear a mask indoors in public if:    It's required by laws, rules, regulations, or local guidance.    You have a weakened immune system.    Your age puts you at increased risk of severe disease.    You have a medical condition that puts you at increased risk of severe disease.    Someone in your household has a weakened immune system, is at increased risk for severe disease, or  is unvaccinated.    You're in an area of high transmission.   Where can I get a COVID-19 vaccine?   Visit [facility]'s website for more information. To find a COVID-19 vaccination site near you, visit www.vaccines.gov/  , call [phone]  , or text your zip code to 712980 ([employer]). Message and data rates may apply.   What about travel?   Travel increases your risk of exposure to COVID-19. For more information, see www.cdc.gov/coronavirus/2019-ncov/travelers/index.html  .   I've had close contact with someone who has COVID. Do I need to quarantine, and if so, for how long?   For the most current answer, including a calculator to determine whether you need to stay home and for how long, visit www.cdc.gov/coronavirus/2019-ncov/your-health/quarantine-isolation.html  .   I've tested positive for COVID. How long do I need to isolate?   For the latest recommendations, including a calculator to determine how long you need to stay home, visit www.cdc.gov/coronavirus/2019-ncov/your-health/quarantine-isolation.html  .   What if I develop symptoms that might be from COVID?   For the latest recommendations on what to do if you're sick, including when to seek emergency care, visit www.cdc.gov/coronavirus/2019-ncov/if-you-are-sick/steps-when-sick.html  .    Flu vaccine information   Who should get a flu vaccine?   Everyone 6 months of age and older should get a yearly flu vaccine.   When should I get vaccinated?   You should get a flu vaccine by the end of October. Once you're vaccinated, it takes about two weeks for antibodies to develop and protect you against the flu. That's why it's important to get vaccinated as soon as possible.   After October, is it too late to get vaccinated?   No. You should still get vaccinated. As long as the flu viruses are still in your community, flu vaccines will remain available, even into January of next year or later.   Why do I need a flu vaccine EVERY year?   Flu viruses are constantly  changing, so flu vaccines are usually updated from one season to the next. Your protection from the flu vaccine also lessens over time.   Is the flu vaccine safe?   Yes. Over the last 50 years, hundreds of millions of Americans have safely received the flu vaccines.   What are the side effects of flu vaccines?   You CANNOT get the flu from a flu vaccine. Common side effects of the flu shot include soreness, redness and/or swelling where the shot was given, low grade fever, and aches. Common side effects of the nasal spray flu vaccine for adults include runny nose, headaches, sore throat, and cough. For children, side effects include wheezing, vomiting, muscle aches, and fever.   Does the flu vaccine increase your risk of getting COVID-19?   No. There is no evidence that getting a flu vaccine increases your risk of getting COVID-19.   Is it safe to get the flu vaccine along with a COVID-19 vaccine?   Yes. It's safe to get the flu vaccine with a COVID-19 vaccine or booster.   Contact your healthcare provider TODAY for details on when and where to get your flu vaccine.   Your provider   Your diagnosis was provided by Kelly Cruz, a member of your trusted care team at [facility].   If you have any questions, call us at [phone]  .   View Doctor's Note     Expires on 12/14/22

## 2022-11-14 NOTE — E-VISIT TREATED
Chief Complaint: Coronavirus (COVID-19), cold, sinus pain, allergy, or flu   Patient introduction   Patient is 32-year-old female with cough, sore throat, and headache that started 2 to 4 weeks ago.   Systemic symptoms come and go.   COVID-19 exposure, testing history, and vaccination status:    No known exposure to a person with a confirmed or suspected case of COVID-19.    No recent travel outside of their local community.    Patient had a self-test 7 to 14 days ago. Test result was negative.    Received 1 dose of the COVID-19 vaccine (Pfizer).   Received the vaccine more than 14 days ago.   Risk factors for severe disease from COVID-19 infection:    BMI >= 25.    Healthcare worker.   Warning. The following may warrant further investigation:    Intermittent headache lasting longer than 2 weeks.   Patient-submitted comments: Symptoms started October 7th-14th. I was better for about a week. Symptoms returned October 20th- current. Symptoms have improved, but sore throat has gotten worse..   Patient requests a 3-day excuse note.   General presentation   Patient saw improvement in symptoms for more than 2 days, followed by worsening of symptoms. Symptoms came on gradually.   Fever:    No fever.   Sinus and nasal symptoms:    No nasal or sinus congestion.    No nasal discharge.    No itchy nose or sneezing.   Throat symptoms:    Sore throat.    No known recent strep exposure.    Patient thinks they have strep.    Has discomfort when swallowing but can swallow liquids and solid foods.   Head and body aches:    Headache described as moderate (4 to 6 on a scale of 1 to 10).    No sweats.    No chills.    No myalgia.    No fatigue.   Cough:    Cough is worse at night/while sleeping.    Cough is not productive of sputum.   Wheezing and shortness of breath:    Has previously used an albuterol inhaler for URI, bronchitis, or pneumonia.    Not using an albuterol inhaler for current symptoms because they do not feel they  need it.    No COPD diagnosis.    No asthma diagnosis.    No wheezing.    No shortness of breath.    No previous steroid inhaler use for URIs, bronchitis, or pneumonia.   Chest pain:    No chest pain.   Ear symptoms:    None.   Dizziness:    No dizziness.   Allergies:    Patient has known dust allergies.    Patient does not think symptoms are allergy-related.   Flu exposure:    No recent known exposure to a person with a confirmed flu diagnosis.    Has not had a flu vaccine this season.   Patient is taking over-the-counter medications for current symptoms, including acetaminophen and guaifenesin.   Review of red flags/alarm symptoms:    No changes in alertness or awareness.    No symptoms suggesting airway obstruction.    No symptoms suggesting intracranial hemorrhage.    No persistent headache, sweats, chills, myalgia, or fatigue lasting longer than 2 weeks.    No decreased urination.   Pregnancy/menstrual status/breastfeeding:    Not pregnant.    Not breastfeeding.    Regarding last menstrual period, patient writes: Nov 8th-11th.   Self-exam:    Height: 152 centimeters    Weight: 77.1 kilograms    No difficulty moving their chin toward their chest.    No tonsillar edema.    Tonsils appear normal.    Palatal petechiae.    Neck lymph nodes feel normal.    Has not taken antibiotics for similar symptoms within the past month.   Current medications   Currently taking SUMAtriptan 100 MG tablet, MULTIPLE MINERALS-VITAMINS PO, Indole-3-Carbinol powder, vitamin B-12 1000 MCG tablet, VITAMIN C PO, and PROBIOTIC PRODUCT PO.   Medication allergies   None.   Medication contraindication review   No history of anaphylactic reaction to beta-lactam antibiotics; aspirin triad; blood dyscrasia; bone marrow depression; catecholamine-releasing paraganglioma; coronary artery disease; coagulation disorder; congenital long QT syndrome; depression; electrolyte abnormalities; fungal infection; GI bleeding; GI obstruction; G6PD deficiency;  heart arrhythmia; hypertension; mononucleosis; myasthenia; recent myocardial infarction; NSAID-induced asthma/urticaria; Parkinson's disease; pheochromocytoma; porphyria; Reye syndrome; seizure disorder; ulcerative colitis; and urinary retention.   No history of metoclopramide-associated dystonic reaction and tardive dyskinesia.   No known history of amoxicillin-clavulanate-associated cholestatic jaundice or hepatic impairment.   No known history of azithromycin-associated cholestatic jaundice or hepatic impairment.   Past medical history   Immune conditions: No immunocompromising conditions. Patient is in remission from cancer. Receiving treatment other than chemotherapy, radiation therapy, hormone therapy, or immunotherapy for cancer.   Social history   Patient is a healthcare worker.   Never smoked tobacco.   Assessment   Strep pharyngitis. Ruled out: Traumatic laryngitis.   This is the likely diagnosis based on patient's interview responses and symptoms, including:    Sore throat.    Mild to moderate headache.    Palatal petechiae.   Plan   Medications:    fluconazole 150 mg tablet RX 150mg 1 tab PO once 1d as a single dose for vaginal yeast infection. Repeat in 72 hours if symptoms persist. Amount is 2 tab.    penicillin V potassium 500 mg tablet RX 500mg 1 tab PO bid 10d for infection. This medication is an antibiotic. Take it exactly as directed. You must finish the entire course of medication, even if you feel better after the first few days of treatment. Amount is 20 tab.   The patient's prescriptions will be sent to:   [facility] [id number]   [address] [place] [address] [place] [address]   Phone: [phone]     Fax: [phone]   Other:   Patient was given an excuse note for 3 days.   Education:    Condition and causes    Prevention    Treatment and self-care    When to call provider   ----------   Electronically signed by SHERITA Alvarez FNP-BC on 2022-11-13 at 21:18PM   ----------   Patient Interview  Transcript:   Please carefully consider each question and answer as best you can. This helps your provider give you the best care. Which of these symptoms are bothering you? Select all that apply.    Cough    Sore throat    Headache   Not selected:    Shortness of breath    Fever    Stuffed-up nose or sinuses    Runny nose    Itchy or watery eyes    Itchy nose or sneezing    Loss of smell or taste    Hoarse voice or loss of voice    Sweats    Chills    Muscle or body aches    Fatigue or tiredness    Nausea or vomiting    Diarrhea    I don't have any of these symptoms   Since your current symptoms started, have you been tested for COVID-19? Select one.    Yes   Not selected:    No   When was your most recent COVID-19 test? Select one.    7 to 14 days ago   Not selected:    Within the last week (specify date as MM/DD/YY)    15 to 30 days ago    More than 1 month ago   What type of COVID-19 test did you most recently have? There are two types of COVID-19 tests: - Viral tests check if you're currently infected with COVID-19. For these tests, a nose swab or saliva sample is taken. Viral tests include self-tests and tests done at a doctor's office, lab, or testing site. - Antibody tests check if you've been infected in the past. For these tests, your blood is drawn. Antibody tests can only be done at a doctor's office, lab, or testing site. Select one.    Viral self-test   Not selected:    Viral test at a doctor's office, lab, or testing site    Antibody test   What was the result of your most recent COVID-19 test? Select one.    Negative   Not selected:    Positive    I'm not sure   Have you gotten the COVID-19 vaccine? Select one.    Yes   Not selected:    No   How many total doses of the COVID-19 vaccine have you gotten? This includes boosters as well as additional doses for those who are immunocompromised. Select one.    1 dose   Not selected:    2 doses    3 doses    4 doses    5 doses   Which COVID-19 vaccine did  "you get? Check your Vaccination Record Card under Product Name/. Select one.    Pfizer-BioNTech (Pfizer)   Not selected:    Rei & Rei's Maury Vaccine (J&J/Maury)    Moderna    Novavax   When did you get your COVID-19 vaccine? Select one.    More than 14 days ago   Not selected:    Less than 48 hours (2 days) ago    48 to 72 hours (3 days) ago    3 to 5 days ago    5 to 7 days ago    7 to 14 days ago   In the last 14 days, have you traveled outside of your local community? This includes travel by car, RV, bus, train, or plane. Travel increases your chances of getting and spreading COVID-19. Select one.    No   Not selected:    Yes   In the last 14 days, have you had close contact with someone who has coronavirus (COVID-19)? \"Close contact\" means any of these: - Living in the same household as someone with COVID-19. - Caring for someone with COVID-19. - Being within 6 feet of someone with COVID-19 for a total of at least 15 minutes over a 24-hour period. For example, three 5-minute exposures for a total of 15 minutes. - Being in direct contact with respiratory droplets from someone with COVID-19 (being coughed on, kissing, sharing utensils). Select one.    No, not that I know of   Not selected:    Yes, a confirmed case    Yes, a suspected case   When did your current symptoms start? Select one.    2 to 4 weeks ago   Not selected:    Less than 48 hours ago    3 to 5 days ago    6 to 9 days ago    10 to 14 days ago    More than a month ago   Did your symptoms come on suddenly or gradually? Select one.    Gradually   Not selected:    Suddenly    I'm not sure   Have your symptoms improved at all since they began? Select one.    Yes, but then they came back worse than before   Not selected:    Yes, but they haven't gone away completely    No    I'm not sure   It sounds like you felt better, but now you feel sick again. How long did you feel better? Select one.    More than 2 days   Not selected:   "  Less than 1 day    1 to 2 days    I'm not sure   You mentioned having sweats, chills, aches, fatigue, or headache for two weeks or more. Have you had these symptoms constantly during that time? Select one.    No   Not selected:    Yes   You mentioned having a headache. On a scale of 1 to 10, how severe is your headache pain? Select one.    Moderate (4 to 6)   Not selected:    Mild (1 to 3)    Severe (7 to 9)    Unbearable (10)    The worst headache of my life (10+)   Do you cough so hard that it's made you gag or vomit? By gag, we mean has your coughing made you choke or dry heave? Select all that apply.    No   Not selected:    Yes, my coughing has made me gag    Yes, my coughing has made me vomit   When is your cough the worst? Select all that apply.    At nighttime, or while I'm sleeping   Not selected:    In the morning, or when I wake up    During the day    I haven't noticed a difference depending on time of day   Are you coughing up mucus or phlegm? Select one.    No, my cough is dry   Not selected:    Yes, a little    Yes, a lot   Can you swallow liquids and solid foods? A sore throat may be painful when swallowing, but it shouldn't prevent you from swallowing. Select one.    Yes, but it's uncomfortable   Not selected:    Yes, with ease    Yes, but it's painful    It's hard to swallow anything because it feels like liquids and food get stuck in my throat    No, I can't swallow anything, liquid or solid foods   Since your symptoms started, have you felt dizzy? Select one.    No   Not selected:    Yes, but I can continue with my regular daily activities    Yes, and it makes it hard to stand, walk, or do daily activities   Do you have chest pain? You might also feel it as discomfort, aching, tightness, or squeezing in the chest. Select one.    No   Not selected:    Yes   Have you urinated at least 3 times in the last 24 hours? Select one.    Yes   Not selected:    No   Changes in alertness or awareness may  mean you need emergency care. Since your symptoms started, have you had any of these? Select all that apply.    None of the above   Not selected:    Confusion    Slurred speech    Not knowing where you are or what day it is    Difficulty staying conscious    Fainting or passing out   Do your symptoms include a whistling sound, or wheezing, when you breathe? Select one.    No   Not selected:    Yes    I'm not sure   Do you have any of these symptoms in your ear(s)? Select all that apply.    None of the above   Not selected:    Pain    Pressure    Fullness    Crackling or popping    Plugged or blocked sensation   Can you move your chin toward your chest?    Yes   Not selected:    No, my neck is too stiff   Are your tonsils larger than usual?    No, not that I can tell   Not selected:    Yes    I've had my tonsils removed   Is there any white or yellow pus on your tonsils?    No, not that I can see   Not selected:    Yes   Are there red spots on the roof of your mouth or the back of your throat?    Yes   Not selected:    No, not that I can see   Are your glands/lymph nodes swollen, or does it hurt when you touch them?    No, not that I can tell   Not selected:    Yes   In the past 2 weeks, has anyone around you (such as at school, work, or home) had a confirmed diagnosis of strep throat? A confirmed diagnosis means that a throat swab and lab test were done to verify a strep throat infection. Select one.    No, not that I know of   Not selected:    Yes   Do you think you might have strep throat? Select one.    Yes   Not selected:    No   In the past week, has anyone around you (such as at school, work, or home) had a confirmed diagnosis of the flu? A confirmed diagnosis means that a nose swab was done to verify a flu infection. Select all that apply.    No, not that I know of   Not selected:    I live with someone who has the flu    I've been within touching distance of someone who has the flu    I've walked by, or sat  about 3 feet away from, someone who has the flu    I've been in the same building as someone who has the flu   Have you ever been diagnosed with asthma? Select one.    No   Not selected:    Yes   Have you ever been prescribed albuterol to use for wheezing, cough, or shortness of breath caused by a cold, bronchitis, or pneumonia? Albuterol (ProAir, Proventil, Ventolin) is prescribed as an inhaler or a solution to be used with a nebulizer machine. Select one.    Yes   Not selected:    No, not that I know of   Have you ever been prescribed a steroid inhaler to use for wheezing, cough, or shortness of breath caused by a cold, bronchitis, or pneumonia? Some examples of steroid inhalers include Pulmicort, Flovent, Qvar, and Alvesco. Select one.    No, not that I know of   Not selected:    Yes   Have you used albuterol for your current symptoms? This includes both albuterol inhalers and albuterol solution in a nebulizer machine. Select one.    No, I don't feel like I need it   Not selected:    Yes    No, I don't have any, or it's    Do you need a refill of albuterol? Select one.    No   Not selected:    Yes   Have you ever been diagnosed with chronic obstructive pulmonary disease (COPD)? Select one.    No, not that I know of   Not selected:    Yes   In the past month, have you taken antibiotics for similar symptoms? Examples of antibiotics include amoxicillin, amoxicillin-clavulanate (Augmentin), penicillin, cefdinir (Omnicef), doxycycline, and clindamycin (Cleocin). Select one.    No   Not selected:    Yes   Do you have allergies (pollen, dust mites, mold, animal dander)? Select one.    Yes   Not selected:    No, not that I know of   What kind of allergies do you have? Select all that apply.    Dust allergies   Not selected:    Seasonal allergies (hay fever)    Pet allergies    None of the above    I'm not sure   Do you think your symptoms could be allergy-related? Select one.    No   Not selected:    Yes    I'm  not sure   Have you had a flu shot this season? Select one.    No, not that I know of   Not selected:    Yes, less than 2 weeks ago    Yes, 2 to 4 weeks ago    Yes, 1 to 3 months ago    Yes, 3 to 6 months ago    Yes, more than 6 months ago   Are you pregnant? Select one.    No   Not selected:    Yes   When was your last menstrual period? If you don't currently have periods or no longer have periods, please briefly explain.    -   Within the last 2 weeks, have you: - Given birth - Had a miscarriage - Had a pregnancy loss - Had an  Being postpartum (live birth or loss) within the last 2 weeks increases your risk of flu complications. Select one.    No   Not selected:    Yes   Are you breastfeeding? Select one.    No   Not selected:    Yes   The flu and COVID-19 can be more serious for people with certain conditions or characteristics. These questions help us figure out if you or anyone you live with is at higher risk for complications from these infections. Do either of these statements apply to you? Select all that apply.    I'm a healthcare worker   Not selected:    I'm  or Native [place]    None of the above   Do you smoke tobacco? Select one.    No   Not selected:    Yes, every day    Yes, some days    No, I quit   Do you have any of these conditions? Select all that apply.    None of the above   Not selected:    Chronic lung disease, such as cystic fibrosis or interstitial fibrosis    Heart disease, such as congenital heart disease, congestive heart failure, or coronary artery disease    Disorder of the brain, spinal cord, or nerves and muscles, such as dementia, cerebral palsy, epilepsy, muscular dystrophy, or developmental delay    Metabolic disorder or mitochondrial disease    Cerebrovascular disease, such as stroke or another condition affecting the blood vessels or blood supply to the brain    Down syndrome    Mood disorder, including depression or schizophrenia spectrum  disorders    Substance use disorder, such as alcohol, opioid, or cocaine use disorder    Tuberculosis   Do you live in a group care setting? Examples include: - Nursing home - Residential care - Psychiatric treatment facility - Group home - Dormitory - Board and care home - Homeless shelter - Foster care setting Select one.    No   Not selected:    Yes   Are you a healthcare worker? Select one.    Yes   Not selected:    No   People with a very high body mass index (BMI) are at higher risk for developing complications from the flu and severe illness from COVID-19. To determine your BMI, we need to know your weight and height. Please enter your weight (in pounds).    Weight   Please enter your height.    Height   Do you have any of these conditions that can affect the immune system? Scroll to see all options. Select all that apply.    None of these   Not selected:    History of bone marrow transplant    Chronic kidney disease    Chronic liver disease (including cirrhosis)    HIV/AIDS    Inflammatory bowel disease (Crohn's disease or ulcerative colitis)    Lupus    Moderate to severe plaque psoriasis    Multiple sclerosis    Rheumatoid arthritis    Sickle cell anemia    Alpha or beta thalassemia    History of solid organ transplant (kidney, liver, or heart)    History of spleen removal    An autoimmune disorder not listed here    A condition requiring treatment with long-term use of oral steroids (such as prednisone, prednisolone, or dexamethasone)   Have you ever been diagnosed with cancer? Select one.    Yes, but I'm in remission   Not selected:    Yes, I have cancer now    No   What kind of cancer treatment are you getting? Select all that apply.    A treatment not listed here   Not selected:    Chemotherapy    Radiation therapy    Immunotherapy    Hormone therapy, such as Tamoxifen, Arimidex, or Femara    I'm getting treatment, but I don't know what it is    None   Do any of these apply to you? Select all that  apply.    None of the above   Not selected:    I've been hospitalized within the last 5 days    I have diabetes    I'm in close contact with a child in    Do any of these apply to the people who live with you? Select all that apply.    None of the above   Not selected:    A child under the age of 5    An adult 65 or older    A person who is pregnant    A person who has given birth, had a miscarriage, had a pregnancy loss, or had an  in the last 2 weeks    An  or Native [place]   Does any member of your household have any of these medical conditions? Select all that apply.    None of the above   Not selected:    Asthma    Disorders of the brain, spinal cord, or nerves and muscles, such as dementia, cerebral palsy, epilepsy, muscular dystrophy, or developmental delay    Chronic lung disease, such as COPD or cystic fibrosis    Heart disease, such as congenital heart disease, congestive heart failure, or coronary artery disease    Cerebrovascular disease, such as stroke or another condition affecting the blood vessels or blood supply to the brain    Blood disorders, such as sickle cell disease    Diabetes    Metabolic disorders such as inherited metabolic disorders or mitochondrial disease    Kidney disorders    Liver disorders    Weakened immune system due to illness or medications such as chemotherapy or steroids    Children under the age of 19 who are on long-term aspirin therapy    Extreme obesity (BMI > 40)   Do you have any of these conditions? Scroll to see all options. Select all that apply.    None of the above   Not selected:    Aspirin triad (also known as Samter's triad or ASA triad)    Asthma or hives from taking aspirin or other NSAIDs, such as ibuprofen or naproxen    Blockage or narrowing of the blood vessels of the heart    Blood dyscrasia, such anemia, leukemia, lymphoma, or myeloma    Bone marrow depression    Catecholamine-releasing paraganglioma    Blood clotting  disorder    Congenital long QT syndrome    Depression    Difficulty urinating or completely emptying your bladder    Uncorrected electrolyte abnormalities    Fungal infection    Gastrointestinal (GI) bleeding    Gastrointestinal (GI) obstruction    G6PD deficiency    Recent heart attack    High blood pressure    Irregular heartbeat or heart rhythm    Mononucleosis (mono)    Myasthenia gravis    Parkinson's disease    Pheochromocytoma    Reye syndrome    Seizure disorder    Ulcerative colitis   Have you ever had either of these conditions? Select all that apply.    No   Not selected:    Metoclopramide-associated dystonic reaction    Tardive dyskinesia   Just a few more questions about medications, and then you're finished. Have you used any non-prescription medications or nasal sprays for your current symptoms? Examples include saline sprays, decongestants, NyQuil, and Tylenol. Select one.    Yes   Not selected:    No   Which of these non-prescription medications have you tried? Scroll to see all options. Select all that apply.    Acetaminophen (Tylenol)    Guaifenesin (Mucinex)   Not selected:    Budesonide (Rhinocort)    Cetirizine (Zyrtec)    Chlorpheniramine (Aller-chlor, Chlor-Trimeton)    Cromolyn (NasalCrom)    Dextromethorphan (Delsym, Robitussin, Vicks DayQuil Cough)    Diphenhydramine (Benadryl)    Fexofenadine (Allegra)    Fluticasone (Flonase)    Guaifenesin/dextromethorphan (Delsym DM, Mucinex DM, Robitussin DM)    Ibuprofen (Advil, Motrin, Midol)    Ketotifen (Alaway, Zaditor)    Loratadine (Alavert, Claritin)    Naphazoline-pheniramine (Naphcon-A, Opcon-A, Visine-A)    Omeprazole (Prilosec)    Oxymetazoline (Afrin)    Phenylephrine (Sudafed)    Triamcinolone (Nasacort)    None of the above   Have you taken any monoamine oxidase inhibitor (MAOI) medications in the last 14 days? Examples include rasagiline (Azilect), selegiline (Eldepryl, Zelapar), isocarboxazid (Marplan), phenelzine (Nardil), and  tranylcypromine (Parnate). Select one.    No, not that I know of   Not selected:    Yes   Do you take Kynmobi or Apokyn (apomorphine)? Select one.    No   Not selected:    Yes   Are you still taking these medications listed in your medical record? If you're not taking any of these, click Next. Select all that apply.    SUMAtriptan 100 MG tablet    MULTIPLE MINERALS-VITAMINS PO    Indole-3-Carbinol powder    vitamin B-12 1000 MCG tablet    VITAMIN C PO    PROBIOTIC PRODUCT PO   Are you taking any other medications, vitamins, or supplements? Select one.    No   Not selected:    Yes   Have you ever had an allergic or bad reaction to any medication? Select one.    No   Not selected:    Yes   Are you allergic to milk or to the proteins found in milk (for example, whey or casein)? A milk allergy is different from lactose intolerance. Select one.    No, not that I know of   Not selected:    Yes   Have you ever had jaundice or liver problems as a result of taking amoxicillin-clavulanate (Augmentin)? Jaundice is a condition in which the skin and the whites of the eyes turn yellow. Select all that apply.    No, not that I know of   Not selected:    Yes, jaundice    Yes, liver problems   Have you ever had jaundice or liver problems as a result of taking azithromycin (Zithromax, Zmax)? Jaundice is a condition in which the skin and the whites of the eyes turn yellow. Select all that apply.    No, not that I know of   Not selected:    Yes, jaundice    Yes, liver problems   Do you need a doctor's note? A doctor's note confirms that you received care today and states when you can return to school or work. It does not contain information about your diagnosis or treatment plan. Your provider will make the final decision on whether to give you a doctor's note and for how long. Doctor's notes CANNOT be backdated. We can't provide medical leave paperwork through this type of visit. If more paperwork is needed to request time off,  contact your primary care provider. Select one.    3 days   Not selected:    Today only (1 day)    Today and tomorrow (2 days)    5 days    7 days    10 days    14 days    No   Is there anything else you'd like to tell us about your symptoms?    Symptoms started October 7th-14th. I was better for about a week. Symptoms returned October 20th- current. Symptoms have improved, but sore throat has gotten worse.   ----------   Medical history   Medical history data does not currently exist for this patient.

## 2023-01-04 ENCOUNTER — TELEPHONE (OUTPATIENT)
Dept: OBSTETRICS AND GYNECOLOGY | Facility: CLINIC | Age: 33
End: 2023-01-04
Payer: COMMERCIAL

## 2023-01-04 NOTE — TELEPHONE ENCOUNTER
Patient called wanting to start care for her pregnancy. Her LMP is 152730. She has a history of a LEEP 11/30/20 that showed FIGO IA1 invasive squamous cell carcinoma. (records are in Epic) She has been under the care of Dr. Smyth but was told at her last appointment that she could return to a regular OB-Gyn provider. I wanted to confirm we would be able to care for her at our facility before making an appointment. Please advise.

## 2023-01-04 NOTE — TELEPHONE ENCOUNTER
Left a message for the patient to advise her of the appointment with Justus Chicas next week to evaluate.

## 2023-01-05 DIAGNOSIS — Z32.00 UNCONFIRMED PREGNANCY: Primary | ICD-10-CM

## 2023-01-05 NOTE — TELEPHONE ENCOUNTER
Patient called and advised of her appointment. She is going to check with her employer to see if she can did that date. She will call back if she is unable. She is going to come in for an HCG tomorrow.

## 2023-01-06 ENCOUNTER — LAB (OUTPATIENT)
Dept: OBSTETRICS AND GYNECOLOGY | Facility: CLINIC | Age: 33
End: 2023-01-06
Payer: COMMERCIAL

## 2023-01-06 DIAGNOSIS — Z32.00 UNCONFIRMED PREGNANCY: ICD-10-CM

## 2023-01-06 LAB — HCG INTACT+B SERPL-ACNC: 2933 MIU/ML

## 2023-01-06 PROCEDURE — 84702 CHORIONIC GONADOTROPIN TEST: CPT | Performed by: NURSE PRACTITIONER

## 2023-01-09 ENCOUNTER — TELEPHONE (OUTPATIENT)
Dept: OBSTETRICS AND GYNECOLOGY | Facility: CLINIC | Age: 33
End: 2023-01-09
Payer: COMMERCIAL

## 2023-01-09 NOTE — TELEPHONE ENCOUNTER
----- Message from SHERITA Richardson sent at 1/9/2023  8:40 AM EST -----  Please let patient know her beta HCG level is 2933 which is consistent with approximately 5-6 weeks gestation.  Recommend she keep her next scheduled appointment.

## 2023-01-12 ENCOUNTER — INITIAL PRENATAL (OUTPATIENT)
Dept: OBSTETRICS AND GYNECOLOGY | Facility: CLINIC | Age: 33
End: 2023-01-12
Payer: COMMERCIAL

## 2023-01-12 ENCOUNTER — TELEPHONE (OUTPATIENT)
Dept: OBSTETRICS AND GYNECOLOGY | Facility: CLINIC | Age: 33
End: 2023-01-12

## 2023-01-12 VITALS — SYSTOLIC BLOOD PRESSURE: 133 MMHG | WEIGHT: 177 LBS | BODY MASS INDEX: 34.57 KG/M2 | DIASTOLIC BLOOD PRESSURE: 85 MMHG

## 2023-01-12 DIAGNOSIS — Z34.00 SUPERVISION OF NORMAL FIRST PREGNANCY, ANTEPARTUM: Primary | ICD-10-CM

## 2023-01-12 LAB
ABO GROUP BLD: NORMAL
BACTERIA UR QL AUTO: NORMAL /HPF
BASOPHILS # BLD AUTO: 0.04 10*3/MM3 (ref 0–0.2)
BASOPHILS NFR BLD AUTO: 0.3 % (ref 0–1.5)
BILIRUB UR QL STRIP: NEGATIVE
BLD GP AB SCN SERPL QL: NEGATIVE
C TRACH RRNA CVX QL NAA+PROBE: NOT DETECTED
CANDIDA SPECIES: NEGATIVE
CLARITY UR: CLEAR
COLOR UR: YELLOW
DEPRECATED RDW RBC AUTO: 41.6 FL (ref 37–54)
EOSINOPHIL # BLD AUTO: 0.07 10*3/MM3 (ref 0–0.4)
EOSINOPHIL NFR BLD AUTO: 0.5 % (ref 0.3–6.2)
ERYTHROCYTE [DISTWIDTH] IN BLOOD BY AUTOMATED COUNT: 12.7 % (ref 12.3–15.4)
GARDNERELLA VAGINALIS: NEGATIVE
GLUCOSE UR STRIP-MCNC: NEGATIVE MG/DL
GLUCOSE UR STRIP-MCNC: NEGATIVE MG/DL
HBV SURFACE AG SERPL QL IA: NORMAL
HCT VFR BLD AUTO: 40.9 % (ref 34–46.6)
HCV AB SER DONR QL: NORMAL
HGB BLD-MCNC: 13.9 G/DL (ref 12–15.9)
HGB UR QL STRIP.AUTO: NEGATIVE
HIV1+2 AB SER QL: NORMAL
HYALINE CASTS UR QL AUTO: NORMAL /LPF
IMM GRANULOCYTES # BLD AUTO: 0.07 10*3/MM3 (ref 0–0.05)
IMM GRANULOCYTES NFR BLD AUTO: 0.5 % (ref 0–0.5)
KETONES UR QL STRIP: ABNORMAL
LEUKOCYTE ESTERASE UR QL STRIP.AUTO: ABNORMAL
LYMPHOCYTES # BLD AUTO: 2.62 10*3/MM3 (ref 0.7–3.1)
LYMPHOCYTES NFR BLD AUTO: 20.3 % (ref 19.6–45.3)
MCH RBC QN AUTO: 30.2 PG (ref 26.6–33)
MCHC RBC AUTO-ENTMCNC: 34 G/DL (ref 31.5–35.7)
MCV RBC AUTO: 88.9 FL (ref 79–97)
MONOCYTES # BLD AUTO: 0.62 10*3/MM3 (ref 0.1–0.9)
MONOCYTES NFR BLD AUTO: 4.8 % (ref 5–12)
N GONORRHOEA RRNA SPEC QL NAA+PROBE: NOT DETECTED
NEUTROPHILS NFR BLD AUTO: 73.6 % (ref 42.7–76)
NEUTROPHILS NFR BLD AUTO: 9.46 10*3/MM3 (ref 1.7–7)
NITRITE UR QL STRIP: NEGATIVE
NRBC BLD AUTO-RTO: 0 /100 WBC (ref 0–0.2)
PH UR STRIP.AUTO: 6.5 [PH] (ref 5–8)
PLATELET # BLD AUTO: 224 10*3/MM3 (ref 140–450)
PMV BLD AUTO: 12.7 FL (ref 6–12)
PROT UR QL STRIP: NEGATIVE
PROT UR STRIP-MCNC: NEGATIVE MG/DL
RBC # BLD AUTO: 4.6 10*6/MM3 (ref 3.77–5.28)
RBC # UR STRIP: NORMAL /HPF
REF LAB TEST METHOD: NORMAL
RH BLD: POSITIVE
SP GR UR STRIP: 1.01 (ref 1–1.03)
SQUAMOUS #/AREA URNS HPF: NORMAL /HPF
T PALLIDUM IGG SER QL: NORMAL
T VAGINALIS DNA VAG QL PROBE+SIG AMP: NEGATIVE
UROBILINOGEN UR QL STRIP: ABNORMAL
WBC # UR STRIP: NORMAL /HPF
WBC NRBC COR # BLD: 12.88 10*3/MM3 (ref 3.4–10.8)

## 2023-01-12 PROCEDURE — 86850 RBC ANTIBODY SCREEN: CPT | Performed by: NURSE PRACTITIONER

## 2023-01-12 PROCEDURE — 87086 URINE CULTURE/COLONY COUNT: CPT | Performed by: NURSE PRACTITIONER

## 2023-01-12 PROCEDURE — 86901 BLOOD TYPING SEROLOGIC RH(D): CPT | Performed by: NURSE PRACTITIONER

## 2023-01-12 PROCEDURE — 86780 TREPONEMA PALLIDUM: CPT | Performed by: NURSE PRACTITIONER

## 2023-01-12 PROCEDURE — 87480 CANDIDA DNA DIR PROBE: CPT | Performed by: NURSE PRACTITIONER

## 2023-01-12 PROCEDURE — 36415 COLL VENOUS BLD VENIPUNCTURE: CPT | Performed by: NURSE PRACTITIONER

## 2023-01-12 PROCEDURE — 86762 RUBELLA ANTIBODY: CPT | Performed by: NURSE PRACTITIONER

## 2023-01-12 PROCEDURE — 87491 CHLMYD TRACH DNA AMP PROBE: CPT | Performed by: NURSE PRACTITIONER

## 2023-01-12 PROCEDURE — 81001 URINALYSIS AUTO W/SCOPE: CPT | Performed by: NURSE PRACTITIONER

## 2023-01-12 PROCEDURE — 87591 N.GONORRHOEAE DNA AMP PROB: CPT | Performed by: NURSE PRACTITIONER

## 2023-01-12 PROCEDURE — G0432 EIA HIV-1/HIV-2 SCREEN: HCPCS | Performed by: NURSE PRACTITIONER

## 2023-01-12 PROCEDURE — 86803 HEPATITIS C AB TEST: CPT | Performed by: NURSE PRACTITIONER

## 2023-01-12 PROCEDURE — 87340 HEPATITIS B SURFACE AG IA: CPT | Performed by: NURSE PRACTITIONER

## 2023-01-12 PROCEDURE — 0501F PRENATAL FLOW SHEET: CPT | Performed by: NURSE PRACTITIONER

## 2023-01-12 PROCEDURE — 86900 BLOOD TYPING SEROLOGIC ABO: CPT | Performed by: NURSE PRACTITIONER

## 2023-01-12 PROCEDURE — 85025 COMPLETE CBC W/AUTO DIFF WBC: CPT | Performed by: NURSE PRACTITIONER

## 2023-01-12 PROCEDURE — 83020 HEMOGLOBIN ELECTROPHORESIS: CPT | Performed by: NURSE PRACTITIONER

## 2023-01-12 PROCEDURE — 87510 GARDNER VAG DNA DIR PROBE: CPT | Performed by: NURSE PRACTITIONER

## 2023-01-12 PROCEDURE — 87660 TRICHOMONAS VAGIN DIR PROBE: CPT | Performed by: NURSE PRACTITIONER

## 2023-01-12 NOTE — PROGRESS NOTES
OB Initial Visit    CC- Here for care of current pregnancy, first visit    Subjective:  33 y.o.  presenting for her first obstetrical visit.    LMP: Patient's last menstrual period was 2022.     COMPLAINS OF: Pt has no complaints, is doing well    Has history of Cervical cancer treated with LEEP in .  Pap smears since have been negative.  Her Gyn/onc told her she could see a regular OB/GYN for her pregnancy.  States is planning to transfer care to [place] so that she can deliver at the [facility].    Reviewed and updated:  OBHx, GYNHx (STDs), PMHx, Medications, Allergies, PSHx, Social Hx, Preventative Hx (PAP), Hx of abuse/safe environment, Vaccine Hx including hx of chickenpox or vaccine, Genetic Hx (pt, FOB, both families).        Objective:  /85   Wt 80.3 kg (177 lb)   LMP 2022   BMI 34.57 kg/m²           General- NAD, alert and oriented, appropriate  Psych- Normal mood, good memory  Neck- No masses, no thyroid enlargement  CV- Regular rhythm, no murnurs  Resp- CTA to bases, no wheezes  Abdomen- Soft, non distended, non tender, no masses    Breast left- deferred  Breast right- deferred    External genitalia- Normal, no lesions  Urethra- Normal, no masses, non tender  Vagina- Normal, no discharge  Bladder- Normal, no masses, non tender  Cvx- Normal, no lesions, no discharge, no CMT  Uterus- Normal shape and consistency, non tender, Consistent with dates.    Adnexa- Normal, no mass, non tender    Lymphatic- No palpable neck, axillary, or groin nodes  Ext- No edema, no cyanosis    Skin- No lesions, no rashes, no acanthosis nigricans    Assessment and Plan:  6w0d  Diagnoses and all orders for this visit:    1. Supervision of normal first pregnancy, antepartum (Primary)  Overview:  MICH finalized: 23 per LMP, dating scan ordered    Genetic testing (NIPS-Quad)/CF/AFP:  Discussed all, patient is considering    COVID: Fully vaccinated  Flu: Fully  vaccinated  Tdap:    Rhogam:  ?Sterilization:    Anatomy US:  FU US:    PROBLEM LIST/PLAN:   Hx of cervical CA - treated with LEEP in 2020, negative paps since treatment         Orders:  -     POC Urinalysis Dipstick  -     Hemoglobinopathy Fractionation [place]  -     OB Panel With HIV  -     Urinalysis With Microscopic - Urine, Clean Catch  -     Urine Culture - Urine, Urine, Random Void  -     Cancel: POC Pregnancy, Urine  -     Gardnerella vaginalis, Trichomonas vaginalis, Candida albicans, DNA - Swab, Cervix  -     Chlamydia trachomatis, Neisseria gonorrhoeae, PCR - Swab, Cervix  -     US Ob Transvaginal; Future      Genetic Screening:   • Considering   • CF  • NIPS  • QUAD    Vaccines:   • s/p FLU vaccine this season  • s/p COVID vaccine    Counseling:   • Nutrition discussed, calories, activity/exercise in pregnancy  • Discussed dietary restrictions/safety food preparation in pregnancy  • Reviewed what to expect prenatal visits, office providers and [facility] hospitalists  • Appropriate trimester precautions provided, N/V, vag bleeding, cramping  • Questions answered  • Encouraged patient to consider transferring care early in her pregnancy     Labs:   • Prenatal labs, cultures, and PAP performed (prn)    Return in about 4 weeks (around 2/9/2023) for [facility], OB follow up.      Justus Marte, APRN  01/12/2023    [facility] DARRYN GARVEY  [facility]  [place] [address]  Dept: [phone]  Loc: [phone]

## 2023-01-12 NOTE — TELEPHONE ENCOUNTER
Please call patient, I have consulted with Dr. Hunter to make sure she did not need any additional interventions.  Dr. Hunter recommends she have her next pap smear collected as she has been having these every three months and the last was in September.  Please schedule her to come in at her convenience, can come back today if desired.

## 2023-01-12 NOTE — TELEPHONE ENCOUNTER
Patient is advised regarding information and does  not want to come back to office to have done will dom until next appointment. Since was not done today went over reason why we did not do one today due to we needed to get clarification on what needs to be done so only swabs was done for infection screen.

## 2023-01-13 ENCOUNTER — TELEPHONE (OUTPATIENT)
Dept: OBSTETRICS AND GYNECOLOGY | Facility: CLINIC | Age: 33
End: 2023-01-13
Payer: COMMERCIAL

## 2023-01-13 PROBLEM — Z28.39 RUBELLA NON-IMMUNE STATUS, ANTEPARTUM: Status: ACTIVE | Noted: 2023-01-13

## 2023-01-13 PROBLEM — O09.899 RUBELLA NON-IMMUNE STATUS, ANTEPARTUM: Status: ACTIVE | Noted: 2023-01-13

## 2023-01-13 LAB
BACTERIA SPEC AEROBE CULT: NORMAL
HGB A MFR BLD ELPH: 97.4 % (ref 96.4–98.8)
HGB A2 MFR BLD ELPH: 2.6 % (ref 1.8–3.2)
HGB F MFR BLD ELPH: 0 % (ref 0–2)
HGB FRACT BLD-IMP: NORMAL
HGB S MFR BLD ELPH: 0 %
RUBV IGG SERPL IA-ACNC: <0.9 INDEX

## 2023-01-13 NOTE — TELEPHONE ENCOUNTER
Per LORENA Mary PA-C, with Dr. Smyth's office, patient needs one pap in early pregnancy. If negative, can defer next pap until after delivery.  Please notify if cervix appears abnormal.    Patient requested to have collected at next visit on 2/9/23.

## 2023-01-16 ENCOUNTER — TELEPHONE (OUTPATIENT)
Dept: OBSTETRICS AND GYNECOLOGY | Facility: CLINIC | Age: 33
End: 2023-01-16
Payer: COMMERCIAL

## 2023-01-16 DIAGNOSIS — R82.71 ASYMPTOMATIC BACTERIURIA IN PREGNANCY: Primary | ICD-10-CM

## 2023-01-16 DIAGNOSIS — O99.891 ASYMPTOMATIC BACTERIURIA IN PREGNANCY: Primary | ICD-10-CM

## 2023-01-16 RX ORDER — NITROFURANTOIN 25; 75 MG/1; MG/1
100 CAPSULE ORAL 2 TIMES DAILY
Qty: 14 CAPSULE | Refills: 0 | Status: SHIPPED | OUTPATIENT
Start: 2023-01-16 | End: 2023-01-23

## 2023-01-16 NOTE — TELEPHONE ENCOUNTER
Please let patient know her urine culture shows a heavy growth of normal bacteria, will send prescription to her pharmacy.

## 2023-01-19 ENCOUNTER — TELEPHONE (OUTPATIENT)
Dept: OBSTETRICS AND GYNECOLOGY | Facility: CLINIC | Age: 33
End: 2023-01-19
Payer: COMMERCIAL

## 2023-01-19 DIAGNOSIS — O21.9 NAUSEA AND VOMITING DURING PREGNANCY: Primary | ICD-10-CM

## 2023-01-19 RX ORDER — PYRIDOXINE HCL (VITAMIN B6) 25 MG
25 TABLET ORAL EVERY 8 HOURS
Qty: 90 TABLET | Refills: 2 | Status: SHIPPED | OUTPATIENT
Start: 2023-01-19

## 2023-01-19 NOTE — TELEPHONE ENCOUNTER
Provider: SHERITA LU  Caller: JAMARI SPEAR  Relationship to Patient: SELF  Pharmacy: [facility] [id number]  Phone Number: [phone]  Reason for Call: PT IS REQUESTING A PRESCRIPTION FOR NAUSEA.

## 2023-01-19 NOTE — TELEPHONE ENCOUNTER
Sent pyridoxine and dolylamine to her pharmacy.  She is to take the pyridoxine every 8 hours and the doxylamine as needed every 6-8 hours.  If she takes both before bed, it should help for the next morning.

## 2023-02-03 ENCOUNTER — HOSPITAL ENCOUNTER (EMERGENCY)
Facility: HOSPITAL | Age: 33
Discharge: HOME | End: 2023-02-03
Attending: EMERGENCY MEDICINE | Admitting: EMERGENCY MEDICINE
Payer: COMMERCIAL

## 2023-02-03 VITALS
DIASTOLIC BLOOD PRESSURE: 89 MMHG | RESPIRATION RATE: 20 BRPM | BODY MASS INDEX: 34.41 KG/M2 | HEIGHT: 60 IN | WEIGHT: 175.27 LBS | TEMPERATURE: 98.6 F | SYSTOLIC BLOOD PRESSURE: 121 MMHG | OXYGEN SATURATION: 100 % | HEART RATE: 93 BPM

## 2023-02-03 DIAGNOSIS — R11.2 NAUSEA AND VOMITING, UNSPECIFIED VOMITING TYPE: Primary | ICD-10-CM

## 2023-02-03 DIAGNOSIS — Z3A.09 9 WEEKS GESTATION OF PREGNANCY: ICD-10-CM

## 2023-02-03 LAB
BACTERIA UR QL AUTO: ABNORMAL /HPF
BILIRUB UR QL STRIP: NEGATIVE
CLARITY UR: CLEAR
COLOR UR: YELLOW
FLUAV AG NPH QL: NEGATIVE
FLUBV AG NPH QL IA: NEGATIVE
GLUCOSE UR STRIP-MCNC: NEGATIVE MG/DL
HGB UR QL STRIP.AUTO: NEGATIVE
HYALINE CASTS UR QL AUTO: ABNORMAL /LPF
KETONES UR QL STRIP: NEGATIVE
LEUKOCYTE ESTERASE UR QL STRIP.AUTO: ABNORMAL
NITRITE UR QL STRIP: NEGATIVE
PH UR STRIP.AUTO: 6.5 [PH] (ref 5–8)
PROT UR QL STRIP: ABNORMAL
RBC # UR STRIP: ABNORMAL /HPF
REF LAB TEST METHOD: ABNORMAL
SP GR UR STRIP: 1.03 (ref 1–1.03)
SQUAMOUS #/AREA URNS HPF: ABNORMAL /HPF
UROBILINOGEN UR QL STRIP: ABNORMAL
WBC # UR STRIP: ABNORMAL /HPF

## 2023-02-03 PROCEDURE — U0004 COV-19 TEST NON-CDC HGH THRU: HCPCS

## 2023-02-03 PROCEDURE — 99283 EMERGENCY DEPT VISIT LOW MDM: CPT

## 2023-02-03 PROCEDURE — 87086 URINE CULTURE/COLONY COUNT: CPT | Performed by: NURSE PRACTITIONER

## 2023-02-03 PROCEDURE — 63710000001 ONDANSETRON ODT 4 MG TABLET DISPERSIBLE: Performed by: EMERGENCY MEDICINE

## 2023-02-03 PROCEDURE — C9803 HOPD COVID-19 SPEC COLLECT: HCPCS | Performed by: EMERGENCY MEDICINE

## 2023-02-03 PROCEDURE — 81001 URINALYSIS AUTO W/SCOPE: CPT | Performed by: NURSE PRACTITIONER

## 2023-02-03 PROCEDURE — 87804 INFLUENZA ASSAY W/OPTIC: CPT

## 2023-02-03 RX ORDER — ONDANSETRON 4 MG/1
4 TABLET, ORALLY DISINTEGRATING ORAL ONCE
Status: COMPLETED | OUTPATIENT
Start: 2023-02-03 | End: 2023-02-03

## 2023-02-03 RX ORDER — ONDANSETRON 4 MG/1
4 TABLET, ORALLY DISINTEGRATING ORAL 4 TIMES DAILY PRN
Qty: 15 TABLET | Refills: 0 | Status: SHIPPED | OUTPATIENT
Start: 2023-02-03

## 2023-02-03 RX ADMIN — ONDANSETRON 4 MG: 4 TABLET, ORALLY DISINTEGRATING ORAL at 20:25

## 2023-02-04 LAB — SARS-COV-2 RNA PNL SPEC NAA+PROBE: NOT DETECTED

## 2023-02-04 NOTE — DISCHARGE INSTRUCTIONS
Rest, drink plenty of fluids.  Take your meds as prescribed.  You may also take the Unisom at home with this medication.  You may take over-the-counter acetaminophen as needed for aches pains and fever.  Call your OB on Monday to advise them of your recent nausea and vomiting and ER visit and follow-up with them as directed.  Return to the emergency department immediately for any acutely developing pelvic pain, any persistent vomiting, any urinary symptoms or any new or worse concerns.

## 2023-02-04 NOTE — ED PROVIDER NOTES
Time: 7:57 PM EST  Date of encounter:  2/3/2023  Independent Historian/Clinical History and Information was obtained by:   Patient  Chief Complaint   Patient presents with   • Nausea   • Vomiting     9 wks pregnant, n/v/d started this am. Denies abd pain/ bleeding. Recent exposure to covid   • Diarrhea       History is limited by: N/A    History of Present Illness:  Patient is a 33 y.o. year old female who presents to the emergency department for evaluation of aausea and vomiting . PT is approximately 9 weeks.  She states that she has been nauseated since becoming pregnant but that her nausea and vomiting started 3 days ago.  It returned today only worse.  The concern is that her nausea had stopped but is now returned worse than ever.  She denies any back pain, any cramping, body aches, chills, difficulty or burning with urination, or vaginal bleeding.(Aida, APRN - PIT Provider).    The patient presents to the emergency department and states that about 9 AM this morning she developed nausea and states that she has had several episodes of vomiting throughout the day.  She states that she has not held down a whole lot but had been taking some Unisom at home and has been holding down a few crackers and some water this evening.  She states that she is 9 weeks pregnant and has had a pretty uneventful pregnancy.  She states she was experiencing some nausea and vomiting week 3 through 8 but for the last week has been doing better with morning sickness.  She states that she has had no urinary symptoms.  She denies any vaginal bleeding or discharge.  She reports that she had a normal intrauterine ultrasound last Friday at her OB office.  She states she follows back up with them on February 24.  She is a G1, P0.  She denies any abdominal pain or tenderness.  She states she has had no pelvic discomfort or back pain.  She reports no recent fevers cough or congestion.  She states that she did have a COVID exposure on  Saturday but has had no other symptoms.      History provided by:  Patient   used: No        Patient Care Team  Primary Care Provider: Provider, Kamilah Known    Past Medical History:     No Known Allergies  Past Medical History:   Diagnosis Date   • Allergic rhinitis    • Cancer (HCC)    • GERD (gastroesophageal reflux disease)      Past Surgical History:   Procedure Laterality Date   • LASIK Bilateral    • LEEP  11/2020     Family History   Problem Relation Age of Onset   • Hypertension Other    • Diabetes Other        Home Medications:  Prior to Admission medications    Medication Sig Start Date End Date Taking? Authorizing Provider   doxylamine (UNISOM) 25 MG tablet Take 1 tablet by mouth Every 8 (Eight) Hours As Needed for Sleep or Nausea. 1/19/23   Justus Marte APRN   FIBER-VITAMINS-MINERALS PO     ProviderMariana MD   Prenatal Vit-Fe Fumarate-FA (PRENATAL VITAMINS PO)  1/1/23   ProviderMariana MD   PROBIOTIC PRODUCT PO Take 1 tablet by mouth Daily.    ProviderMariana MD   pyridoxine (VITAMIN B-6) 25 MG tablet Take 1 tablet by mouth Every 8 (Eight) Hours. 1/19/23   Justus Marte APRN        Social History:   Social History     Tobacco Use   • Smoking status: Never   Vaping Use   • Vaping Use: Never used   Substance Use Topics   • Alcohol use: Not Currently   • Drug use: Never         Review of Systems:  Review of Systems   Constitutional: Negative for chills and fever.   HENT: Negative for congestion, ear pain and sore throat.    Eyes: Negative for pain.   Respiratory: Negative for cough, chest tightness and shortness of breath.    Cardiovascular: Negative for chest pain and leg swelling.   Gastrointestinal: Positive for nausea and vomiting. Negative for abdominal pain and diarrhea.   Genitourinary: Negative for dysuria, flank pain, frequency, hematuria, pelvic pain, urgency, vaginal bleeding and vaginal discharge.   Musculoskeletal: Negative for back pain, joint  "swelling, neck pain and neck stiffness.   Skin: Negative for pallor and rash.   Neurological: Negative for seizures and headaches.   All other systems reviewed and are negative.       Physical Exam:  /89 (BP Location: Right arm, Patient Position: Sitting)   Pulse 93   Temp 98.6 °F (37 °C) (Oral)   Resp 20   Ht 152.4 cm (60\")   Wt 79.5 kg (175 lb 4.3 oz)   LMP 12/01/2022   SpO2 100%   BMI 34.23 kg/m²     Physical Exam  Vitals and nursing note reviewed.   Constitutional:       General: She is not in acute distress.     Appearance: Normal appearance. She is not ill-appearing or toxic-appearing.   HENT:      Head: Normocephalic and atraumatic.      Mouth/Throat:      Mouth: Mucous membranes are moist.   Eyes:      General: No scleral icterus.     Conjunctiva/sclera: Conjunctivae normal.      Pupils: Pupils are equal, round, and reactive to light.   Cardiovascular:      Rate and Rhythm: Normal rate and regular rhythm.      Pulses: Normal pulses.      Heart sounds: Normal heart sounds.   Pulmonary:      Effort: Pulmonary effort is normal. No respiratory distress.      Breath sounds: Normal breath sounds. No wheezing.   Abdominal:      General: Abdomen is flat.      Palpations: Abdomen is soft.      Tenderness: There is no abdominal tenderness. There is no guarding or rebound.   Musculoskeletal:         General: No swelling or tenderness. Normal range of motion.      Cervical back: Normal range of motion and neck supple. No rigidity.      Right lower leg: No edema.      Left lower leg: No edema.   Lymphadenopathy:      Cervical: No cervical adenopathy.   Skin:     General: Skin is warm and dry.      Capillary Refill: Capillary refill takes less than 2 seconds.      Findings: No rash.   Neurological:      General: No focal deficit present.      Mental Status: She is alert and oriented to person, place, and time. Mental status is at baseline.   Psychiatric:         Mood and Affect: Mood normal.         " Behavior: Behavior normal.                  Procedures:  Procedures      Medical Decision Making:      Comorbidities that affect care:    Cancer    External Notes reviewed:    None      The following orders were placed and all results were independently analyzed by me:  Orders Placed This Encounter   Procedures   • COVID-19,APTIMA PANTHER(NEAL), MISTI/ JULES, NP/OP SWAB IN UTM/VTM/SALINE TRANSPORT MEDIA,24 HR TAT - Swab, Nasal Cavity   • Influenza Antigen, Rapid - Swab, Nasopharynx   • Urine Culture - Urine,   • Urinalysis With Microscopic If Indicated (No Culture) - Urine, Clean Catch   • Urinalysis, Microscopic Only - Urine, Clean Catch       Medications Given in the Emergency Department:  Medications   ondansetron ODT (ZOFRAN-ODT) disintegrating tablet 4 mg (4 mg Oral Given 2/3/23 2025)        ED Course:    The patient was initially evaluated in the triage area where orders were placed. The patient was later dispositioned by SHERITA Petersen.      The patient was advised to stay for completion of workup which includes but is not limited to communication of labs and radiological results, reassessment and plan. The patient was advised that leaving prior to disposition by a provider could result in critical findings that are not communicated to the patient.     ED Course as of 02/03/23 2220 Fri Feb 03, 2023 1954   --- PROVIDER IN TRIAGE NOTE ---    Patient was seen and evaluated in triage by me SHERITA Patel.  Orders were written and the patient is currently awaiting disposition.   [MS]   2143 The patient has been tolerating p.o. fluids without vomiting and without difficulty.  We discussed the need for her to follow-up with her OB on Monday to see when they would like to follow-up with her either at her regularly scheduled appointment or sooner.  She verbalized understanding of follow-up and return instructions to the ED [TC]      ED Course User Index  [MS] Cindy Huynh APRN  [TC] Katherine  SHERITA Rashid       Labs:    Lab Results (last 24 hours)     Procedure Component Value Units Date/Time    COVID-19,APTIMA PANTHER(NEAL),BH MISTI/BH JULES, NP/OP SWAB IN UTM/VTM/SALINE TRANSPORT MEDIA,24 HR TAT - Swab, Nasal Cavity [[id number]] Collected: 02/03/23 1959    Specimen: Swab from Nasal Cavity Updated: 02/03/23 2015    Influenza Antigen, Rapid - Swab, Nasopharynx [[id number]]  (Normal) Collected: 02/03/23 1959    Specimen: Swab from Nasopharynx Updated: 02/03/23 2059     Influenza A Ag, EIA Negative     Influenza B Ag, EIA Negative    Urinalysis With Microscopic If Indicated (No Culture) - Urine, Clean Catch [[id number]]  (Abnormal) Collected: 02/03/23 2142    Specimen: Urine, Clean Catch Updated: 02/03/23 2208     Color, UA Yellow     Appearance, UA Clear     pH, UA 6.5     Specific Gravity, UA 1.026     Glucose, UA Negative     Ketones, UA Negative     Bilirubin, UA Negative     Blood, UA Negative     Protein, UA Trace     Leuk Esterase, UA Small (1+)     Nitrite, UA Negative     Urobilinogen, UA 1.0 E.U./dL    Urinalysis, Microscopic Only - Urine, Clean Catch [[id number]]  (Abnormal) Collected: 02/03/23 2142    Specimen: Urine, Clean Catch Updated: 02/03/23 2208     RBC, UA 0-2 /HPF      WBC, UA 13-20 /HPF      Bacteria, UA None Seen /HPF      Squamous Epithelial Cells, UA 7-12 /HPF      Hyaline Casts, UA 3-6 /LPF      Methodology Automated Microscopy    Urine Culture - Urine, Urine, Clean Catch [[id number]] Collected: 02/03/23 2142    Specimen: Urine, Clean Catch Updated: 02/03/23 2213           Imaging:    No Radiology Exams Resulted Within Past 24 Hours      Differential Diagnosis and Discussion:      Vomiting: Differential diagnosis includes but is not limited to migraine, labyrinthine disorders, psychogenic, metabolic and endocrine causes, peptic ulcer, gastric outlet obstruction, gastritis, gastroenteritis, appendicitis, intestinal obstruction, paralytic ileus, food poisoning, cholecystitis, acute hepatitis,  acute pancreatitis, acute febrile illness, and myocardial infarction.    All labs were reviewed and analyzed by me.    MDM  Number of Diagnoses or Management Options  9 weeks gestation of pregnancy: minor  Nausea and vomiting, unspecified vomiting type: minor     Amount and/or Complexity of Data Reviewed  Clinical lab tests: reviewed    Risk of Complications, Morbidity, and/or Mortality  Presenting problems: low  Diagnostic procedures: low  Management options: low    Patient Progress  Patient progress: stable           Consultants/Shared Management Plan:    None    Social Determinants of Health:    Patient is independent, reliable, and has access to care.       Disposition and Care Coordination:    Discharged: The patient is suitable and stable for discharge with no need for consideration of observation or admission.    I have explained the patient´s condition, diagnoses and treatment plan based on the information available to me at this time. I have answered questions and addressed any concerns. The patient has a good  understanding of the patient´s diagnosis, condition, and treatment plan as can be expected at this point. The vital signs have been stable. The patient´s condition is stable and appropriate for discharge from the emergency department.      The patient will pursue further outpatient evaluation with the primary care physician or other designated or consulting physician as outlined in the discharge instructions. They are agreeable to this plan of care and follow-up instructions have been explained in detail. The patient has received these instructions in written format and have expressed an understanding of the discharge instructions. The patient is aware that any significant change in condition or worsening of symptoms should prompt an immediate return to this or the closest emergency department or call to 911.  I have explained discharge medications and the need for follow up with the  patient/caretakers. This was also printed in the discharge instructions. Patient was discharged with the following medications and follow up:      Medication List      New Prescriptions    ondansetron ODT 4 MG disintegrating tablet  Commonly known as: ZOFRAN-ODT  Place 1 tablet on the tongue 4 (Four) Times a Day As Needed for Nausea or Vomiting.           Where to Get Your Medications      These medications were sent to [facility] [id number] - [place], [place] - [address] AT [facility] [address] - [phone]  - [phone]   [address], [place] [address]    Phone: [phone]   · ondansetron ODT 4 MG disintegrating tablet      YOUR OB  NEXT WEEK           Final diagnoses:   Nausea and vomiting, unspecified vomiting type   9 weeks gestation of pregnancy        ED Disposition     ED Disposition   Discharge    Condition   Stable    Comment   --             This medical record created using voice recognition software.           Gay Murphy, SHERITA  02/03/23 4101

## 2023-02-05 LAB — BACTERIA SPEC AEROBE CULT: NO GROWTH

## 2023-02-06 ENCOUNTER — TELEPHONE (OUTPATIENT)
Dept: OBSTETRICS AND GYNECOLOGY | Facility: CLINIC | Age: 33
End: 2023-02-06

## 2023-02-06 NOTE — TELEPHONE ENCOUNTER
Left message on patients voicemail letting her know that she would need to follow up with the provider who she is now seeing. I also let her know that she is still scheduled with our office on 02/09 and she can call to cancel that if she did in fact transfer.

## 2023-02-06 NOTE — TELEPHONE ENCOUNTER
Hub staff attempted to follow warm transfer process and was unsuccessful     Caller: Betsy Puente    Relationship to patient: Self    Best call back number: [phone]    Patient is needing: PT HAS AN OB APPOINTMENT ON 2-9 BUT SAYS SHE RECENTLY TRANSFERRED CARE SO SHE COULD DELIVER AT A BIRTHING CENTER IN [place], PT WAS SEEN IN ER ON Friday AND WANTS TO KNOW IF WE CAN STILL SEE HER FOR THE URINE TESTS THAT CAME BACK OR IF SHE JUST NEEDS TO FOLLOW UP WITH HER NEW OB.     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

## 2023-10-10 ENCOUNTER — TELEPHONE (OUTPATIENT)
Dept: GYNECOLOGIC ONCOLOGY | Facility: CLINIC | Age: 33
End: 2023-10-10
Payer: COMMERCIAL

## 2023-10-10 NOTE — TELEPHONE ENCOUNTER
Spoke with patient to discuss options to transfer care to an external Gyn Onc provider due to Dr. Smyth no longer being with [facility] as of 9/1/23. Offered the following options- [facility] or [facility]. Explained that it is their choice and we will help to facilitate this transition to ensure minimal disruption to their care. Patient prefers to go to [facility] and prefers female provider. I explained next steps for referring to new provider and that we would coordinate with that practice to schedule. Patient confirmed understanding.       This patient needs to be schedule first available.

## 2023-10-11 ENCOUNTER — TELEPHONE (OUTPATIENT)
Dept: GYNECOLOGIC ONCOLOGY | Facility: CLINIC | Age: 33
End: 2023-10-11
Payer: COMMERCIAL

## 2023-10-11 DIAGNOSIS — C53.9 SQUAMOUS CELL CARCINOMA OF CERVIX: Primary | ICD-10-CM

## 2023-10-11 NOTE — TELEPHONE ENCOUNTER
Left message for patient to call our office regarding upcoming appointment with gyn/onc @ [facility]    HUB to Read:    Scheduled with Dr. Escobar 10/16 @ 1:45  [address] [id number] [phone]     ** Having issues getting insurance to go through.

## 2023-10-17 ENCOUNTER — TELEPHONE (OUTPATIENT)
Dept: GYNECOLOGIC ONCOLOGY | Age: 33
End: 2023-10-17

## 2023-10-17 NOTE — TELEPHONE ENCOUNTER
Caller: Shonda Puenteissa NIKO    Relationship: Self    Best call back number: [phone]    Who are you requesting to speak with (clinical staff, provider,  specific staff member): ANAIS    What was the call regarding: BETSY WAS RETURNING ANAIS'S CALL. SHE SAYS IF SHE DOES NOT ANSWER, PLEASE LEAVE ALL THE INFORMATION ON HER VOICEMAIL.